# Patient Record
Sex: FEMALE | Race: WHITE | NOT HISPANIC OR LATINO | Employment: PART TIME | URBAN - METROPOLITAN AREA
[De-identification: names, ages, dates, MRNs, and addresses within clinical notes are randomized per-mention and may not be internally consistent; named-entity substitution may affect disease eponyms.]

---

## 2018-01-04 ENCOUNTER — HISTORICAL (OUTPATIENT)
Dept: RADIOLOGY | Facility: HOSPITAL | Age: 63
End: 2018-01-04

## 2018-02-07 ENCOUNTER — HISTORICAL (OUTPATIENT)
Dept: ENDOSCOPY | Facility: HOSPITAL | Age: 63
End: 2018-02-07

## 2018-04-09 ENCOUNTER — TELEPHONE (OUTPATIENT)
Dept: GASTROENTEROLOGY | Facility: CLINIC | Age: 63
End: 2018-04-09

## 2018-04-09 NOTE — TELEPHONE ENCOUNTER
Spoke with patient. Patient will call back to let the clinic know if 4/19 is a good time for her new patient appointment with Dr. Em.

## 2018-04-16 ENCOUNTER — TELEPHONE (OUTPATIENT)
Dept: GASTROENTEROLOGY | Facility: CLINIC | Age: 63
End: 2018-04-16

## 2018-04-16 NOTE — TELEPHONE ENCOUNTER
----- Message from Jessica Leslie sent at 4/16/2018  8:42 AM CDT -----  Contact: pt#[phone]  Pt wants to know if medical records was received. Please call

## 2018-04-16 NOTE — TELEPHONE ENCOUNTER
Spoke to Patient.    Patient was advised we have received all the records from Dr. Kraus.    Patient verbalizes understanding and confirms appointment.

## 2018-04-18 NOTE — PROGRESS NOTES
"[facility] Consultation Note    Reason for Consult:    Chief Complaint   Patient presents with    Chest Pain    Dysphagia    Emesis    Nausea    Bloated    Gas    Constipation    Weight Loss         PCP:   Taisha Richter   [address] / [place] [address]    Referring MD:  Uziel Kraus Md  [address]  [address]  [place] [address]      HPI:  Amber Gomez is a 62 y.o. female with a PMH of HLD, OA hips, kidney CA s/p partial left nephrectomy, WALKER, osteoporosis referred to motility clinic for second opinion regarding the following problems:  "second opinion for GERD, dysphagia, did not respond to botox treatment"    Dysphagia.  Odynophagia. Reports difficulty and painful swallowing.  Onset of symptoms:since 37 years old  Problems with solids:yes  Problems with liquids:yes  Choking while eating:no  Coughing while eating: yes  Location: upper, mid, and distal esophagus  Frequency: daily  Improves with:coughs it back up for relief. With white foamy materials. 2-3 week improvement after dilation. Few week improvement with Botox (minimal)  History of food impactions requiring ED visit:no  History of allergies:grass, dirt, carpet, pet dander   History of seasonal allergies:yes  History of food allergies:no  History of eczema:no    Nausea.  With bitter tasting belch. No vomiting.  Frequency: daily. Lasts for a few minutes  Onset: since botox in 2/2018  Improves with: time, belching  Has not tried zofran, phenergan, compazine, reglan, domperidone, scopolamine patch.      Early satiety. Since 37 yrs old. Full after few bits. Is eating smaller portions    Abdominal pain. Reports abdominal pain  Character:soreness, ache  Location:epigastric  Frequency:  constant  Duration:constant  Onset:since age 37 with worsening 1 year ago  Worse with:meals, swallowing  Improves with: no improvement with pantoprazole 40 mg daily x few weeks, but was coughing it back up d/t " dysphagia  Associated with Bm: no  Nocturnal pain: yes  Has not tried IBgard, Bentyl, Levsin, Levbid.  Antidepressants:no  Using narcotic pain medication: no     Gas and bloating.   Bloating: yes  Excessive gas: yes  Abdominal distension: yes   Belching: yes  Symptoms get worse after meals:yes  Symptoms get worse as the day progresses: no   Consumes lactose:rare  Consumes artificial sugars:no    Constipation. After partial nephrectomy in 10/2017.  1 bristol type 1 BM/day. Strains. Feelings of incomplete evacuation. Feelings of rectal blockage. No trauma. Tried digital evacuation w/o relief. No problems in childhood.   Some improvement with colace once daily  No improvement with Miralax BID x one month  Unable to tolerate Linzess 72 and 145 mcg daily or once weekly d/t severe diarrhea   Has not tried amitiza, trulance, fiber  Took  metamucil for diarrhea years ago.    Weight loss. 20 lb wt loss over a few months time last year associated with kidney CA. Now weight fluctuates.    WALKER. improvement with CPAP.    Denies GERD, vomiting,  diarrhea,  BRBPR, melena,  anxiety/depression.       Total visit time was 90 minutes, more than 50% of which was spent in face-to-face counseling with patient regarding symptoms, diagnostic results, prognosis, risks and benefits of treatment options, instructions for management, importance of compliance with chosen treatment options, risk factor reduction, stress reduction, coping strategies.      Previous Studies:   EGD 2/7/2018: benign appearing GEJ esophageal stenosis injected with 100 u botox. Nl stomach. Nl duodenum.   Esophagram 1/4/18: marked distal esophageal dysmotility with tertiary contractions. Stricture around GEJ w/ Barium tablet not passing into stomach. Tiny sliding HH.   Esophageal manometry 12/21/17: diffuse esophageal spasm. Suspect evolving type III spastic achalasia. IRP nl 5.1; DCI low 375.6; DL 5.1; failed swallows 50%; pan esophageal pressurization 10%; premature  contractions 20%; rapid contractions 30%; small breaks 40%; incomplete bolus clearance 100%. Personally reviewed by Dr. Em.    EGD 11/17/17: esophageal diverticulum. Dilated and tortuous esophagus. Esophageal dilation with 18mm balloon. GEJ NL (reflux changes in squamouscolumnar mucosa). NL Stomach (reactive gastropathy). NL duodenum.   CT chest 2/1/17: stable scattered tiny lung nodules. Diffuse esophageal wall thickening possible esophagitis.   Esophageal manometry 4/6/15: diffuse esophageal spasm with weak esophageal peristalsis and elevated LES. Residual post swallow pressures creating some degree of outflow obstruction which may be a subtype of NGA. IRP high 23.5; DCI .4; DL 4.7; failed swallows 10%; pan esophageal pressurization 10%; premature contractions 30%; rapid contractions 10%; small breaks 20%; incomplete bolus clearance 100%.  Personally reviewed by me.  GEJ Outflow obstruction w 30% premature contractions.   Colon 11/8/13: GPTTI. Sigmoid, descending, and hepatic flexure tics. NL TI. NL Colon rpt 7-10 yrs.  abd us 9/30/13: solid lft renal mass.   EGD 9/20/13: diverticulum at lower third of the esophagus. Benign stricture at lower third of esophagus. Dilated with balloon to 19 mm. Small HH. Tortuous esophagus. NL stomach(?). NL duodenum (reactive change).   *EGD 5/9/13: small HH. Minimal irritation at distal esophagus. Distal stomach w/ small ulcerations.  *EGD 6/5/07: small HH. Suspected distal esophageal structuring. antral gastritis. Nl duodenum. Torrie test negative.     *per referring provider note    ROS:  ROS   Constitutional: No fevers, no chills, no night sweats, no weight loss  ENT: No congestion, no rhinorrhea, no chronic sinus problems  CV: No chest pain, no palpitations  Pulm: + cough, no shortness of breath  Ophtho: No blurry vision, no eye redness  GI: see HPI  Derm: No rash  Heme: No lymphadenopathy, no bruising  MSK: + OA, no joint swelling, no Raynauds  : No dysuria, no  frequent urination, no blood in urine  Endo: No hot or cold intolerance  Neuro: No dizziness, no syncope, no seizure  Psych: No anxiety, no depression        Medical History:   Past Medical History:   Diagnosis Date    Cancer of left kidney     Diverticulitis     Fibroid tumor     positive for cancer    High cholesterol     Osteoarthritis of both hips     Osteoporosis         Surgical History:   Past Surgical History:   Procedure Laterality Date    BACK SURGERY  2010    CARPAL TUNNEL RELEASE  2016    CHOLECYSTECTOMY  2013    COLONOSCOPY  2016    ESOPHAGOGASTRODUODENOSCOPY  2017    KNEE SURGERY      PARTIAL HYSTERECTOMY      ROTATOR CUFF REPAIR  2012    SHOULDER SURGERY  2006    SINUS SURGERY  1986    TONSILLECTOMY          Family History:   Family History   Problem Relation Age of Onset    Heart defect Father     Stomach cancer Brother 67    Celiac disease Neg Hx     Colon cancer Neg Hx     Crohn's disease Neg Hx     Esophageal cancer Neg Hx     Inflammatory bowel disease Neg Hx     Irritable bowel syndrome Neg Hx     Liver cancer Neg Hx     Rectal cancer Neg Hx     Ulcerative colitis Neg Hx         Social History:   Social History     Social History    Marital status: Unknown     Spouse name: N/A    Number of children: N/A    Years of education: N/A     Social History Main Topics    Smoking status: Never Smoker    Smokeless tobacco: Never Used    Alcohol use No    Drug use: No    Sexual activity: Not Asked     Other Topics Concern    None     Social History Narrative    None        Review of patient's allergies indicates:  Allergies not on file    Current Outpatient Prescriptions   Medication Sig Dispense Refill    alendronate (FOSAMAX) 70 MG tablet TK 1 T PO Q WK  3    CRESTOR 20 mg tablet once daily.       fenofibric acid (FIBRICOR) 135 mg CpDR once daily.       fluticasone (FLONASE) 50 mcg/actuation nasal spray once daily.       hydroCHLOROthiazide (HYDRODIURIL) 50 MG  tablet once daily.       montelukast (SINGULAIR) 10 mg tablet once daily.       potassium chloride 10% (KAYCIEL) 20 mEq/15 mL oral solution Take by mouth once daily. One tablespoon in the morning and night      PREMARIN 0.625 mg tablet TK 1 T PO D  3    diltiaZEM (CARDIZEM) 30 MG tablet Take 1 tablet (30 mg total) by mouth every 6 (six) hours. Take 4 times a day 120 tablet 3    pantoprazole (PROTONIX) 40 MG tablet once daily.        No current facility-administered medications for this visit.         Objective Findings:  Vital Signs:  /79   Pulse 80   Ht 5' (1.524 m)   Wt 71.9 kg (158 lb 8.2 oz)   BMI 30.96 kg/m²   Body mass index is 30.96 kg/m².    Physical Exam:  General appearance: alert, cooperative, no distress  HENT: Normocephalic, atraumatic, neck symmetrical, no nasal discharge  Eyes: conjunctivae/corneas clear, PERRL, EOM's intact  Lungs: clear to auscultation bilaterally, no dullness to percussion bilaterally  Heart: regular rate and rhythm without rub; no displacement of the PMI  Abdomen: soft, non-tender; bowel sounds normoactive; no organomegaly  Extremities: extremities symmetric; no clubbing, cyanosis, or edema  Integument: Skin color, texture, turgor normal; no rashes; hair distrubution normal  Neurologic: Alert and oriented X 3, normal strength, normal coordination and gait  Psychiatric: no pressured speech; normal affect; no evidence of impaired cognition    Labs:  No results found for: WBC, HGB, HCT, MCV, PLT  No results found for: FERRITIN  No results found for: NA, K, CL, CO2, GLU, BUN, CREATININE, CALCIUM, PROT, ALBUMIN, BILITOT, ALKPHOS, AST, ALT  No results found for: TSH  No results found for: SEDRATE  No results found for: CRP  No results found for: LABA1C, HGBA1C        Assessment and Plan:  Amber Gomez is a 62 y.o. female with a PMH of HLD, OA hips, kidney CA s/p partial left nephrectomy, WALKER, osteoporosis referred to motility clinic for second opinion regarding the  "following problems:  "second opinion for GERD, dysphagia, did not respond to botox treatment"    Dysphagia.  Odynophagia. Regurgitation of foamy liquid. Symptoms present for >20 years. Conflicting results on previous manometry (NGA, low IRP (2017) and NGA w high IRP (2015). Esophagram with barium tablet hung up in GEJ.  Diverticulum, HH and tortuous esophagus on imaging.  Clinical presentation concerning for achalasia, possibly end stage.      No improvement with GEJ dilation  No improvement with botox (2/2018).   -Discussed that recent Botox injection may compromise the results of our workup, pt decided to delay workup to 6 months post Botox injection.   -EGD w e/g/d biopsies in 6 months (8/2018)    -Esophageal manometry in 6 months   -Esophagogram w 13 mm tablet in 6 months   -Start diltiazem 30 mg QID. Can increase to 60 mg if not better at follow up provided blood pressure is not affected.  -Start IBguard prn  -Will consider SL nitroglycerin    Nausea.  No vomiting. Early satiety.   -Small frequent meals   -EGD     Abdominal pain. Epigastric  No improvement with pantoprazole 40 mg daily x few weeks  -Start IBgard  -EGD  -Will consider Bentyl, Levsin, Levbid.     Gas and bloating. Distention. belching  Avoids artificial sweeteners  -Eliminate lactose    Constipation.   Unable to tolerate Linzess due to severe diarrhea  Some improvement with colace  -Follow with general gastroenterologist for management    Weight loss. 20 lb wt loss over a few months  last year associated with kidney CA. Recently weight stable    Follow-up for Motility w/ KATHY Beatty. in 2 months w Rick Kenyon, Dr. Em after imaging completed.     1. Esophageal dysphagia    2. Odynophagia    3. Epigastric abdominal pain    4. Abdominal bloating    5. Nausea    6. Early satiety          Order summary:  Orders Placed This Encounter    FL Esophagram Complete    Manometry Esophageal    diltiaZEM (CARDIZEM) 30 MG tablet    Case request GI: " ESOPHAGOGASTRODUODENOSCOPY (EGD)         Thank you so much for allowing me to participate in the care of ADRIÁN Fowler, FNP-C  Katlyn Em MD

## 2018-04-19 ENCOUNTER — OFFICE VISIT (OUTPATIENT)
Dept: GASTROENTEROLOGY | Facility: CLINIC | Age: 63
End: 2018-04-19
Payer: COMMERCIAL

## 2018-04-19 VITALS
DIASTOLIC BLOOD PRESSURE: 79 MMHG | HEART RATE: 80 BPM | HEIGHT: 60 IN | SYSTOLIC BLOOD PRESSURE: 123 MMHG | BODY MASS INDEX: 31.12 KG/M2 | WEIGHT: 158.5 LBS

## 2018-04-19 DIAGNOSIS — R14.0 ABDOMINAL BLOATING: ICD-10-CM

## 2018-04-19 DIAGNOSIS — R13.19 ESOPHAGEAL DYSPHAGIA: Primary | ICD-10-CM

## 2018-04-19 DIAGNOSIS — R13.10 ODYNOPHAGIA: ICD-10-CM

## 2018-04-19 DIAGNOSIS — R11.0 NAUSEA: ICD-10-CM

## 2018-04-19 DIAGNOSIS — R10.13 EPIGASTRIC ABDOMINAL PAIN: ICD-10-CM

## 2018-04-19 DIAGNOSIS — R68.81 EARLY SATIETY: ICD-10-CM

## 2018-04-19 PROCEDURE — 99999 PR PBB SHADOW E&M-EST. PATIENT-LVL IV: CPT | Mod: PBBFAC,,, | Performed by: NURSE PRACTITIONER

## 2018-04-19 PROCEDURE — 99205 OFFICE O/P NEW HI 60 MIN: CPT | Mod: S$GLB,,, | Performed by: NURSE PRACTITIONER

## 2018-04-19 RX ORDER — FENOFIBRIC ACID 135 MG/1
CAPSULE, DELAYED RELEASE ORAL DAILY
COMMUNITY
Start: 2018-04-04 | End: 2022-08-25

## 2018-04-19 RX ORDER — ALENDRONATE SODIUM 70 MG/1
TABLET ORAL
Refills: 3 | COMMUNITY
Start: 2018-03-21 | End: 2022-08-25

## 2018-04-19 RX ORDER — MONTELUKAST SODIUM 10 MG/1
TABLET ORAL DAILY
COMMUNITY
Start: 2018-01-10 | End: 2022-08-23

## 2018-04-19 RX ORDER — PANTOPRAZOLE SODIUM 40 MG/1
TABLET, DELAYED RELEASE ORAL DAILY
COMMUNITY
Start: 2018-04-18 | End: 2018-08-10

## 2018-04-19 RX ORDER — HYDROCHLOROTHIAZIDE 50 MG/1
50 TABLET ORAL DAILY
COMMUNITY
Start: 2018-03-22 | End: 2023-09-12

## 2018-04-19 RX ORDER — ESTROGENS, CONJUGATED 0.62 MG/1
TABLET, FILM COATED ORAL
Refills: 3 | COMMUNITY
Start: 2018-03-22 | End: 2022-08-25

## 2018-04-19 RX ORDER — DILTIAZEM HYDROCHLORIDE 30 MG/1
30 TABLET, FILM COATED ORAL EVERY 6 HOURS
Qty: 120 TABLET | Refills: 3 | Status: ON HOLD | OUTPATIENT
Start: 2018-04-19 | End: 2018-08-09 | Stop reason: SDUPTHER

## 2018-04-19 RX ORDER — POTASSIUM CHLORIDE 20 MEQ/15ML
20 SOLUTION ORAL DAILY
COMMUNITY

## 2018-04-19 RX ORDER — FLUTICASONE PROPIONATE 50 MCG
SPRAY, SUSPENSION (ML) NASAL DAILY
COMMUNITY
Start: 2018-04-04 | End: 2022-08-25

## 2018-04-19 RX ORDER — DILTIAZEM HYDROCHLORIDE 60 MG/1
60 TABLET, FILM COATED ORAL EVERY 6 HOURS
Qty: 120 TABLET | Refills: 3 | Status: SHIPPED | OUTPATIENT
Start: 2018-04-19 | End: 2018-04-19 | Stop reason: SDUPTHER

## 2018-04-19 RX ORDER — ROSUVASTATIN CALCIUM 20 MG/1
TABLET, FILM COATED ORAL DAILY
COMMUNITY
Start: 2018-04-17 | End: 2022-08-25

## 2018-06-05 ENCOUNTER — PATIENT MESSAGE (OUTPATIENT)
Dept: GASTROENTEROLOGY | Facility: CLINIC | Age: 63
End: 2018-06-05

## 2018-06-05 ENCOUNTER — TELEPHONE (OUTPATIENT)
Dept: ENDOSCOPY | Facility: HOSPITAL | Age: 63
End: 2018-06-05

## 2018-06-05 NOTE — TELEPHONE ENCOUNTER
Left message for pt to call to schedule motility and egd ordered by Dr Em, pt is due to come in August 2018.

## 2018-06-11 ENCOUNTER — TELEPHONE (OUTPATIENT)
Dept: ENDOSCOPY | Facility: HOSPITAL | Age: 63
End: 2018-06-11

## 2018-06-11 ENCOUNTER — PATIENT MESSAGE (OUTPATIENT)
Dept: GASTROENTEROLOGY | Facility: CLINIC | Age: 63
End: 2018-06-11

## 2018-06-14 ENCOUNTER — TELEPHONE (OUTPATIENT)
Dept: GASTROENTEROLOGY | Facility: CLINIC | Age: 63
End: 2018-06-14

## 2018-06-14 NOTE — TELEPHONE ENCOUNTER
Attempting to reach pt x 2 for phone consent with MATTEO Kraft NP.  No answer, message left for pt to return call to office.

## 2018-06-14 NOTE — TELEPHONE ENCOUNTER
----- Message from Carla Perales sent at 6/14/2018  9:29 AM CDT -----  Contact: self - [phone]  [facility] - returning your call re test to schedule - please call patient at

## 2018-06-14 NOTE — TELEPHONE ENCOUNTER
Spoke with patient and sent message to Radiology to schedule esophagram for when patient returns to [place].

## 2018-06-20 ENCOUNTER — PATIENT MESSAGE (OUTPATIENT)
Dept: GASTROENTEROLOGY | Facility: CLINIC | Age: 63
End: 2018-06-20

## 2018-06-21 ENCOUNTER — TELEPHONE (OUTPATIENT)
Dept: ENDOSCOPY | Facility: HOSPITAL | Age: 63
End: 2018-06-21

## 2018-06-21 ENCOUNTER — TELEPHONE (OUTPATIENT)
Dept: GASTROENTEROLOGY | Facility: CLINIC | Age: 63
End: 2018-06-21

## 2018-06-21 NOTE — TELEPHONE ENCOUNTER
Spoke with patient and let patient know about appointments with Dr. Em, esophagram and manometry/EGD.    Patient confirms and reminders mailed out.

## 2018-06-21 NOTE — TELEPHONE ENCOUNTER
Spoke with patient.    Patient is requesting to have the EGD/Manometry and after the results come in to come back and have the esophagram done at [facility].    We are unable to coordinate the esophagram with the EGD Manometry due to the 24 hour pH impedence and fasting.     Please advise.

## 2018-06-21 NOTE — TELEPHONE ENCOUNTER
----- Message from Aaliyah Bolanos MA sent at 6/21/2018  3:00 PM CDT -----      ----- Message -----  From: Trudy Painter  Sent: 6/21/2018   2:03 PM  To: Swapnil Garcia Staff    Needs Advice    Reason for call:  Regarding scheduling tests    Communication Preference: [phone]  Additional Information:

## 2018-06-22 ENCOUNTER — TELEPHONE (OUTPATIENT)
Dept: ENDOSCOPY | Facility: HOSPITAL | Age: 63
End: 2018-06-22

## 2018-06-22 NOTE — TELEPHONE ENCOUNTER
Esophageal Manometry and EGD scheduled on 8/9/18 Manometry @8:00am and EGD to follow at 9:30am. Ok per Dr. Em to book these procedures on the same day. New prep mailed.

## 2018-08-09 ENCOUNTER — HOSPITAL ENCOUNTER (OUTPATIENT)
Facility: HOSPITAL | Age: 63
Discharge: HOME | End: 2018-08-09
Attending: INTERNAL MEDICINE | Admitting: INTERNAL MEDICINE
Payer: COMMERCIAL

## 2018-08-09 ENCOUNTER — TELEPHONE (OUTPATIENT)
Dept: RADIOLOGY | Facility: HOSPITAL | Age: 63
End: 2018-08-09

## 2018-08-09 ENCOUNTER — ANESTHESIA EVENT (OUTPATIENT)
Dept: ENDOSCOPY | Facility: HOSPITAL | Age: 63
End: 2018-08-09
Payer: COMMERCIAL

## 2018-08-09 ENCOUNTER — ANESTHESIA (OUTPATIENT)
Dept: ENDOSCOPY | Facility: HOSPITAL | Age: 63
End: 2018-08-09
Payer: COMMERCIAL

## 2018-08-09 VITALS
HEIGHT: 60 IN | TEMPERATURE: 98 F | SYSTOLIC BLOOD PRESSURE: 135 MMHG | HEART RATE: 75 BPM | RESPIRATION RATE: 18 BRPM | OXYGEN SATURATION: 96 % | BODY MASS INDEX: 30.43 KG/M2 | DIASTOLIC BLOOD PRESSURE: 64 MMHG | WEIGHT: 155 LBS

## 2018-08-09 DIAGNOSIS — R13.10 DYSPHAGIA: ICD-10-CM

## 2018-08-09 DIAGNOSIS — R13.19 ESOPHAGEAL DYSPHAGIA: ICD-10-CM

## 2018-08-09 DIAGNOSIS — R13.10 ODYNOPHAGIA: ICD-10-CM

## 2018-08-09 PROCEDURE — 25000003 PHARM REV CODE 250: Performed by: INTERNAL MEDICINE

## 2018-08-09 PROCEDURE — 88305 TISSUE EXAM BY PATHOLOGIST: CPT | Performed by: PATHOLOGY

## 2018-08-09 PROCEDURE — 37000008 HC ANESTHESIA 1ST 15 MINUTES: Performed by: INTERNAL MEDICINE

## 2018-08-09 PROCEDURE — 43239 EGD BIOPSY SINGLE/MULTIPLE: CPT | Performed by: INTERNAL MEDICINE

## 2018-08-09 PROCEDURE — 88305 TISSUE EXAM BY PATHOLOGIST: CPT | Mod: 26,,, | Performed by: PATHOLOGY

## 2018-08-09 PROCEDURE — 25000003 PHARM REV CODE 250: Performed by: NURSE ANESTHETIST, CERTIFIED REGISTERED

## 2018-08-09 PROCEDURE — 91010 ESOPHAGUS MOTILITY STUDY: CPT | Mod: 26,,, | Performed by: INTERNAL MEDICINE

## 2018-08-09 PROCEDURE — E9220 PRA ENDO ANESTHESIA: HCPCS | Mod: ,,, | Performed by: NURSE ANESTHETIST, CERTIFIED REGISTERED

## 2018-08-09 PROCEDURE — 27201012 HC FORCEPS, HOT/COLD, DISP: Performed by: INTERNAL MEDICINE

## 2018-08-09 PROCEDURE — 91037 ESOPH IMPED FUNCTION TEST: CPT | Performed by: INTERNAL MEDICINE

## 2018-08-09 PROCEDURE — 91010 ESOPHAGUS MOTILITY STUDY: CPT | Performed by: INTERNAL MEDICINE

## 2018-08-09 PROCEDURE — 63600175 PHARM REV CODE 636 W HCPCS: Performed by: NURSE ANESTHETIST, CERTIFIED REGISTERED

## 2018-08-09 PROCEDURE — 91037 ESOPH IMPED FUNCTION TEST: CPT | Mod: 26,,, | Performed by: INTERNAL MEDICINE

## 2018-08-09 PROCEDURE — 43239 EGD BIOPSY SINGLE/MULTIPLE: CPT | Mod: ,,, | Performed by: INTERNAL MEDICINE

## 2018-08-09 PROCEDURE — 37000009 HC ANESTHESIA EA ADD 15 MINS: Performed by: INTERNAL MEDICINE

## 2018-08-09 RX ORDER — SODIUM CHLORIDE 9 MG/ML
INJECTION, SOLUTION INTRAVENOUS CONTINUOUS
Status: DISCONTINUED | OUTPATIENT
Start: 2018-08-09 | End: 2018-08-09 | Stop reason: HOSPADM

## 2018-08-09 RX ORDER — SODIUM CHLORIDE 0.9 % (FLUSH) 0.9 %
3 SYRINGE (ML) INJECTION
Status: DISCONTINUED | OUTPATIENT
Start: 2018-08-09 | End: 2018-08-09 | Stop reason: HOSPADM

## 2018-08-09 RX ORDER — LIDOCAINE HYDROCHLORIDE 20 MG/ML
JELLY TOPICAL ONCE
Status: COMPLETED | OUTPATIENT
Start: 2018-08-09 | End: 2018-08-09

## 2018-08-09 RX ORDER — PROPOFOL 10 MG/ML
VIAL (ML) INTRAVENOUS
Status: DISCONTINUED | OUTPATIENT
Start: 2018-08-09 | End: 2018-08-09

## 2018-08-09 RX ORDER — GLYCOPYRROLATE 0.2 MG/ML
INJECTION INTRAMUSCULAR; INTRAVENOUS
Status: DISCONTINUED | OUTPATIENT
Start: 2018-08-09 | End: 2018-08-09

## 2018-08-09 RX ORDER — DILTIAZEM HYDROCHLORIDE 30 MG/1
TABLET, FILM COATED ORAL
Qty: 120 TABLET | Refills: 0 | Status: SHIPPED | OUTPATIENT
Start: 2018-08-09 | End: 2018-09-07 | Stop reason: SDUPTHER

## 2018-08-09 RX ORDER — LIDOCAINE HCL/PF 100 MG/5ML
SYRINGE (ML) INTRAVENOUS
Status: DISCONTINUED | OUTPATIENT
Start: 2018-08-09 | End: 2018-08-09

## 2018-08-09 RX ADMIN — LIDOCAINE HYDROCHLORIDE 10 ML: 20 JELLY TOPICAL at 07:08

## 2018-08-09 RX ADMIN — LIDOCAINE HYDROCHLORIDE 100 MG: 20 INJECTION, SOLUTION INTRAVENOUS at 09:08

## 2018-08-09 RX ADMIN — GLYCOPYRROLATE 0.2 MG: 0.2 INJECTION, SOLUTION INTRAMUSCULAR; INTRAVENOUS at 09:08

## 2018-08-09 RX ADMIN — SODIUM CHLORIDE: 0.9 INJECTION, SOLUTION INTRAVENOUS at 07:08

## 2018-08-09 RX ADMIN — PROPOFOL 50 MG: 10 INJECTION, EMULSION INTRAVENOUS at 09:08

## 2018-08-09 RX ADMIN — PROPOFOL 100 MG: 10 INJECTION, EMULSION INTRAVENOUS at 09:08

## 2018-08-09 NOTE — DISCHARGE INSTRUCTIONS

## 2018-08-09 NOTE — ANESTHESIA POSTPROCEDURE EVALUATION
Anesthesia Post Evaluation    Patient: Amber Gomez    Procedure(s) Performed: Procedure(s) (LRB):  MANOMETRY, ESOPHAGEAL, WITH IMPEDANCE MEASUREMENT (N/A)  ESOPHAGOGASTRODUODENOSCOPY (EGD) (N/A)    Final Anesthesia Type: general  Patient location during evaluation: PACU  Patient participation: Yes- Able to Participate  Level of consciousness: awake and alert and oriented  Post-procedure vital signs: reviewed and stable  Pain management: adequate  Airway patency: patent  PONV status at discharge: No PONV  Anesthetic complications: no      Cardiovascular status: blood pressure returned to baseline and hemodynamically stable  Respiratory status: unassisted, spontaneous ventilation and room air  Hydration status: euvolemic  Follow-up not needed.        Visit Vitals  /64   Pulse 75   Temp 36.7 °C (98.1 °F)   Resp 18   Ht 5' (1.524 m)   Wt 70.3 kg (155 lb)   SpO2 96%   Breastfeeding? No   BMI 30.27 kg/m²       Pain/Adal Score: Pain Assessment Performed: Yes (8/9/2018 10:15 AM)  Presence of Pain: denies (8/9/2018 10:15 AM)  Adal Score: 10 (8/9/2018 10:15 AM)

## 2018-08-09 NOTE — ANESTHESIA PREPROCEDURE EVALUATION
08/09/2018  Amber Gomez is a 62 y.o., female.    Anesthesia Evaluation    I have reviewed the Patient Summary Reports.     I have reviewed the Medications.     Review of Systems  Anesthesia Hx:  No problems with previous Anesthesia Denies Hx of Anesthetic complications  Neg history of prior surgery. Denies Family Hx of Anesthesia complications.   Denies Personal Hx of Anesthesia complications.   Social:  Non-Smoker, Social Alcohol Use    Hematology/Oncology:  Hematology Normal   Oncology Normal     EENT/Dental:EENT/Dental Normal   Cardiovascular:  Cardiovascular Normal Exercise tolerance: good     Pulmonary:  Pulmonary Normal    Renal/:  Renal/ Normal  Denies Chronic Renal Disease.     Hepatic/GI:  Hepatic/GI Normal    Musculoskeletal:   Arthritis     Neurological:  Neurology Normal    Endocrine:  Endocrine Normal    Dermatological:  Skin Normal    Psych:  Psychiatric Normal           Physical Exam  General:  Well nourished    Airway/Jaw/Neck:  Airway Findings: Mouth Opening: Normal Tongue: Normal  General Airway Assessment: Adult  Mallampati: II  Improves to II with phonation.  TM Distance: Normal, at least 6 cm     Eyes/Ears/Nose:  EYES/EARS/NOSE FINDINGS: Normal   Dental:  DENTAL FINDINGS: Normal   Chest/Lungs:  Chest/Lungs Findings: Clear to auscultation, Normal Respiratory Rate     Heart/Vascular:  Heart Findings: Rate: Normal  Rhythm: Regular Rhythm  Sounds: Normal  Heart murmur: negative       Mental Status:  Mental Status Findings:  Cooperative, Alert and Oriented         Anesthesia Plan  Type of Anesthesia, risks & benefits discussed:  Anesthesia Type:  general  Patient's Preference: General  Intra-op Monitoring Plan: standard ASA monitors  Intra-op Monitoring Plan Comments:   Post Op Pain Control Plan:   Post Op Pain Control Plan Comments:   Induction:   IV  Beta Blocker:  Patient is not  currently on a Beta-Blocker (No further documentation required).       Informed Consent: Patient understands risks and agrees with Anesthesia plan.  Questions answered. Anesthesia consent signed with patient.  ASA Score: 2     Day of Surgery Review of History & Physical:    H&P update referred to the surgeon.         Ready For Surgery From Anesthesia Perspective.

## 2018-08-09 NOTE — TRANSFER OF CARE
Anesthesia Transfer of Care Note    Patient: Amber Gomez    Procedure(s) Performed: Procedure(s) (LRB):  MANOMETRY, ESOPHAGEAL, WITH IMPEDANCE MEASUREMENT (N/A)  ESOPHAGOGASTRODUODENOSCOPY (EGD) (N/A)    Patient location: PACU    Anesthesia Type: general    Transport from OR: Transported from OR on room air with adequate spontaneous ventilation    Post pain: adequate analgesia    Post assessment: tolerated procedure well and no apparent anesthetic complications    Post vital signs: stable    Level of consciousness: awake, alert and oriented    Nausea/Vomiting: no nausea/vomiting    Complications: none    Transfer of care protocol was followed      Last vitals:   Visit Vitals  /79 (BP Location: Left arm, Patient Position: Lying)   Pulse 74   Temp 36.6 °C (97.9 °F) (Temporal)   Resp 18   Ht 5' (1.524 m)   Wt 70.3 kg (155 lb)   SpO2 96%   Breastfeeding? No   BMI 30.27 kg/m²

## 2018-08-09 NOTE — [employer] PATIENT INSTRUCTIONS
Discharge Summary/Instructions after an Endoscopic Procedure  Patient Name: Amber Gomez  Patient MRN: [id number]  Patient YOB: 1955 Thursday, August 09, 2018  Katlyn Em MD  RESTRICTIONS:  During your procedure today, you received medications for sedation.  These   medications may affect your judgment, balance and coordination.  Therefore,   for 24 hours, you have the following restrictions:   - DO NOT drive a car, operate machinery, make legal/financial decisions,   sign important papers or drink alcohol.    ACTIVITY:  Today: no heavy lifting, straining or running due to procedural   sedation/anesthesia.  The following day: return to full activity including work.  DIET:  Eat and drink normally unless instructed otherwise.     TREATMENT FOR COMMON SIDE EFFECTS:  - Mild abdominal pain, nausea, belching, bloating or excessive gas:  rest,   eat lightly and use a heating pad.  - Sore Throat: treat with throat lozenges and/or gargle with warm salt   water.  - Because air was used during the procedure, expelling large amounts of air   from your rectum or belching is normal.  - If a bowel prep was taken, you may not have a bowel movement for 1-3 days.    This is normal.  SYMPTOMS TO WATCH FOR AND REPORT TO YOUR PHYSICIAN:  1. Abdominal pain or bloating, other than gas cramps.  2. Chest pain.  3. Back pain.  4. Signs of infection such as: chills or fever occurring within 24 hours   after the procedure.  5. Rectal bleeding, which would show as bright red, maroon, or black stools.   (A tablespoon of blood from the rectum is not serious, especially if   hemorrhoids are present.)  6. Vomiting.  7. Weakness or dizziness.  GO DIRECTLY TO THE NEAREST EMERGENCY ROOM IF YOU HAVE ANY OF THE FOLLOWING:      Difficulty breathing              Chills and/or fever over 101 F   Persistent vomiting and/or vomiting blood   Severe abdominal pain   Severe chest pain   Black, tarry stools   Bleeding- more than one  tablespoon   Any other symptom or condition that you feel may need urgent attention  Your doctor recommends these additional instructions:  If any biopsies were taken, your doctors clinic will contact you in 1 to 2   weeks with any results.  - Discharge patient to home (with escort).   - Resume previous diet.   - Continue present medications.   - Await pathology results.   - Return to my office as previously scheduled.   - The findings and recommendations were discussed with the patient.   - Patient has a contact number available for emergencies.  The signs and   symptoms of potential delayed complications were discussed with the   patient.  Return to normal activities tomorrow.  Written discharge   instructions were provided to the patient.   For questions, problems or results please call your physician - Katlyn Em MD at Work:  [phone].  [facility], EMERGENCY ROOM PHONE NUMBER: [phone]  IF A COMPLICATION OR EMERGENCY SITUATION ARISES AND YOU ARE UNABLE TO REACH   YOUR PHYSICIAN - GO DIRECTLY TO THE EMERGENCY ROOM.  Katlyn Em MD  8/9/2018 9:44:23 AM  This report has been verified and signed electronically.  [employer]

## 2018-08-09 NOTE — H&P
Short Stay Endoscopy History and Physical    PCP - Taisha Richter MD     Procedure - EGD  ASA - per anesthesia  Mallampati - per anesthesia  History of Anesthesia problems - no  Family history Anesthesia problems -  no   Plan of anesthesia - General    HPI:  This is a 62 y.o. female here for evaluation of dysphagia, espigastric pain, nausea :     ROS:  Constitutional: No fevers, chills, No weight loss  CV: No chest pain  Pulm: No cough, No shortness of breath  Ophtho: No vision changes  GI: see HPI  Derm: No rash    Medical History:  has a past medical history of Cancer of left kidney; Diverticulitis; Fibroid tumor; High cholesterol; Osteoarthritis of both hips; and Osteoporosis.    Surgical History:  has a past surgical history that includes Sinus surgery (1986); Partial hysterectomy; Knee surgery; Shoulder surgery (2006); Back surgery (2010); Rotator cuff repair (2012); Cholecystectomy (2013); Carpal tunnel release (2016); Esophagogastroduodenoscopy (2017); Colonoscopy (2016); and Tonsillectomy.    Family History: family history includes Heart defect in her father; Stomach cancer (age of onset: 67) in her brother.. Otherwise no colon cancer, inflammatory bowel disease, or GI malignancies.    Social History:  reports that she has never smoked. She has never used smokeless tobacco. She reports that she does not drink alcohol or use drugs.    Review of patient's allergies indicates:  No Known Allergies    Medications:   Prescriptions Prior to Admission   Medication Sig Dispense Refill Last Dose    CRESTOR 20 mg tablet once daily.    8/8/2018 at Unknown time    fenofibric acid (FIBRICOR) 135 mg CpDR once daily.    8/8/2018 at Unknown time    fluticasone (FLONASE) 50 mcg/actuation nasal spray once daily.    8/8/2018 at Unknown time    hydroCHLOROthiazide (HYDRODIURIL) 50 MG tablet once daily.    8/9/2018 at Unknown time    montelukast (SINGULAIR) 10 mg tablet once daily.    8/8/2018 at Unknown time     pantoprazole (PROTONIX) 40 MG tablet once daily.    8/9/2018 at Unknown time    PREMARIN 0.625 mg tablet TK 1 T PO D  3 8/9/2018 at Unknown time    alendronate (FOSAMAX) 70 MG tablet TK 1 T PO Q WK  3 Unknown at Unknown time    potassium chloride 10% (KAYCIEL) 20 mEq/15 mL oral solution Take by mouth once daily. One tablespoon in the morning and night   Taking       Physical Exam:    Vital Signs:   Vitals:    08/09/18 0724   BP: 123/79   Pulse: 74   Resp: 18   Temp: 97.9 °F (36.6 °C)       General Appearance: Well appearing in no acute distress  Eyes:    No scleral icterus  Lungs: CTA anteriorly  Heart:  Regular rate, S1, S2 normal, no murmurs heard.  Abdomen: Soft, non tender, non distended with normal bowel sounds. No hepatosplenomegaly, ascites, or mass.  Extremities: No edema  Skin: No rash    Labs:  No results found for: WBC, HGB, HCT, PLT, CHOL, TRIG, HDL, LDLDIRECT, ALT, AST, NA, K, CL, CREATININE, BUN, CO2, TSH, PSA, INR, GLUF, HGBA1C, MICROALBUR    I have explained the risks and benefits of endoscopy procedures to the patient including but not limited to bleeding, perforation, infection, and death.      Katlyn Em MD

## 2018-08-10 ENCOUNTER — OFFICE VISIT (OUTPATIENT)
Dept: GASTROENTEROLOGY | Facility: CLINIC | Age: 63
End: 2018-08-10
Payer: COMMERCIAL

## 2018-08-10 ENCOUNTER — HOSPITAL ENCOUNTER (OUTPATIENT)
Dept: RADIOLOGY | Facility: HOSPITAL | Age: 63
Discharge: HOME | End: 2018-08-10
Attending: NURSE PRACTITIONER
Payer: COMMERCIAL

## 2018-08-10 ENCOUNTER — TELEPHONE (OUTPATIENT)
Dept: GASTROENTEROLOGY | Facility: CLINIC | Age: 63
End: 2018-08-10

## 2018-08-10 VITALS
HEIGHT: 60 IN | BODY MASS INDEX: 31.17 KG/M2 | HEART RATE: 80 BPM | SYSTOLIC BLOOD PRESSURE: 115 MMHG | DIASTOLIC BLOOD PRESSURE: 81 MMHG | WEIGHT: 158.75 LBS

## 2018-08-10 DIAGNOSIS — R11.0 NAUSEA: ICD-10-CM

## 2018-08-10 DIAGNOSIS — K59.00 CONSTIPATION, UNSPECIFIED CONSTIPATION TYPE: ICD-10-CM

## 2018-08-10 DIAGNOSIS — R13.19 ESOPHAGEAL DYSPHAGIA: ICD-10-CM

## 2018-08-10 DIAGNOSIS — R91.1 LUNG NODULE: ICD-10-CM

## 2018-08-10 DIAGNOSIS — R68.81 EARLY SATIETY: ICD-10-CM

## 2018-08-10 DIAGNOSIS — R13.10 DYSPHAGIA, UNSPECIFIED TYPE: Primary | ICD-10-CM

## 2018-08-10 DIAGNOSIS — R13.10 ODYNOPHAGIA: ICD-10-CM

## 2018-08-10 DIAGNOSIS — R14.0 BLOATING: ICD-10-CM

## 2018-08-10 PROCEDURE — 3008F BODY MASS INDEX DOCD: CPT | Mod: CPTII,S$GLB,, | Performed by: INTERNAL MEDICINE

## 2018-08-10 PROCEDURE — 74220 X-RAY XM ESOPHAGUS 1CNTRST: CPT | Mod: 26,,, | Performed by: RADIOLOGY

## 2018-08-10 PROCEDURE — 99215 OFFICE O/P EST HI 40 MIN: CPT | Mod: S$GLB,,, | Performed by: INTERNAL MEDICINE

## 2018-08-10 PROCEDURE — 25500020 PHARM REV CODE 255: Performed by: NURSE PRACTITIONER

## 2018-08-10 PROCEDURE — 74220 X-RAY XM ESOPHAGUS 1CNTRST: CPT | Mod: TC

## 2018-08-10 PROCEDURE — 99999 PR PBB SHADOW E&M-EST. PATIENT-LVL III: CPT | Mod: PBBFAC,,, | Performed by: INTERNAL MEDICINE

## 2018-08-10 RX ORDER — PANTOPRAZOLE SODIUM 40 MG/1
40 TABLET, DELAYED RELEASE ORAL 2 TIMES DAILY
Qty: 180 TABLET | Refills: 3 | Status: SHIPPED | OUTPATIENT
Start: 2018-08-10 | End: 2022-08-25

## 2018-08-10 RX ADMIN — IOHEXOL 200 ML: 350 INJECTION, SOLUTION INTRAVENOUS at 08:08

## 2018-08-10 NOTE — [employer] PATIENT INSTRUCTIONS
Discharge Summary/Instructions after an Endoscopic Procedure  Patient Name: Amber Gomez  Patient MRN: [id number]  Patient YOB: 1955 Thursday, August 09, 2018  Katlyn Em MD  RESTRICTIONS:  During your procedure today, you received medications for sedation.  These   medications may affect your judgment, balance and coordination.  Therefore,   for 24 hours, you have the following restrictions:   - DO NOT drive a car, operate machinery, make legal/financial decisions,   sign important papers or drink alcohol.    ACTIVITY:  Today: no heavy lifting, straining or running due to procedural   sedation/anesthesia.  The following day: return to full activity including work.  DIET:  Eat and drink normally unless instructed otherwise.     TREATMENT FOR COMMON SIDE EFFECTS:  - Mild abdominal pain, nausea, belching, bloating or excessive gas:  rest,   eat lightly and use a heating pad.  - Sore Throat: treat with throat lozenges and/or gargle with warm salt   water.  - Because air was used during the procedure, expelling large amounts of air   from your rectum or belching is normal.  - If a bowel prep was taken, you may not have a bowel movement for 1-3 days.    This is normal.  SYMPTOMS TO WATCH FOR AND REPORT TO YOUR PHYSICIAN:  1. Abdominal pain or bloating, other than gas cramps.  2. Chest pain.  3. Back pain.  4. Signs of infection such as: chills or fever occurring within 24 hours   after the procedure.  5. Rectal bleeding, which would show as bright red, maroon, or black stools.   (A tablespoon of blood from the rectum is not serious, especially if   hemorrhoids are present.)  6. Vomiting.  7. Weakness or dizziness.  GO DIRECTLY TO THE NEAREST EMERGENCY ROOM IF YOU HAVE ANY OF THE FOLLOWING:      Difficulty breathing              Chills and/or fever over 101 F   Persistent vomiting and/or vomiting blood   Severe abdominal pain   Severe chest pain   Black, tarry stools   Bleeding- more than one  tablespoon   Any other symptom or condition that you feel may need urgent attention  Your doctor recommends these additional instructions:  If any biopsies were taken, your doctors clinic will contact you in 1 to 2   weeks with any results.  - Return to my office as previously scheduled.  For questions, problems or results please call your physician - Katlny Em MD at Work:  [phone].  [facility], EMERGENCY ROOM PHONE NUMBER: [phone]  IF A COMPLICATION OR EMERGENCY SITUATION ARISES AND YOU ARE UNABLE TO REACH   YOUR PHYSICIAN - GO DIRECTLY TO THE EMERGENCY ROOM.  Katlyn Em MD  8/10/2018 9:32:08 AM  This report has been verified and signed electronically.  [employer]

## 2018-08-10 NOTE — PATIENT INSTRUCTIONS
-Start taking Linzess 72mcg with breakfast every day for constiaption  -If that does not work than increase to two pills 72 pills with breakfast    -Eliminate all forms of dairy/lactose (milk, cheese, butter, creamers, ice cream etc) from your diet for 14 days to see if your symptoms improve.  -Avoid triggers that may cause trouble swallowing: cold fluids, caffeine, smoking, ETOH  -Eat soft foods and liquids as they trigger fewer symptoms  -Start pantoprazole 40mg twice per day   -Try peppermint three times per day (mint, peppermint tea ) to see if that helps move food down esophagus  -Increase diltiazem to 60mg four times per for esophageal spasms.  This may lower your blood pressure.

## 2018-08-10 NOTE — PROGRESS NOTES
"[facility] Consultation Note    Reason for Consult:    Chief Complaint   Patient presents with    Dysphagia    Chest Pain    Abdominal Pain    Nausea    Emesis    Gas    Bloated    Constipation       PCP:   Taisha Richter       Referring MD:  Uziel Kraus Md  [address] [facility]  [address]  [place] [address]    HPI:  Amber Gomez is a 62 y.o. female with a PMH of HLD, OA hips, kidney CA s/p partial left nephrectomy, WALKER, osteoporosis referred to motility clinic for second opinion regarding the following problems:  "second opinion for GERD, dysphagia, did not respond to botox treatment"    Dysphagia.  Still w dyphagi james solids and liquids.  Somewhat improved w diltiazem   Onset of symptoms:since 37 years old  Frequency: daily  Regurgitation of white foamy materials.  Few week improvement with Botox (minimal)  Started diltiazem with improvement of dysphagia and regurgitation    Achalasia Eckardt Score  Dysphagia  (none (0), occasional (1), daily (2), with every meal (3)): 2  Regurgitation (none (0), occasional (1), daily (2), with every meal (3)): 1   Chest pain (none (0), occasional (1), daily (2), several times per day (3)): 0   Weight loss (kg) (0 (0), <5 (1), 5-10 (2), >10 (3)): 0  Total Eckardt Score(the final score is the sum of four components (0-12)): 3     Unable to tolerate peppermint oil due to emesis     Nausea.  Occasional.  Improved w diltiazem   No vomiting.  Early satiety.  Still feels full after eating     Abdominal pain. Has epigastric pain   Frequency:  constant  Onset:since age 37 with worsening 1 year ago  Worse with:meals, swallowing  Did not try IBguard     Gas and bloating. Still with lots of gas and bloating.  Consumes some lactose.  No artificial sugars    Constipation. Recently worse.  bristol type 1 BM/day.   No improvement with Miralax BID x one month  Unable to tolerate Linzess 72  Had diarrhea on linzess 145 mcg daily  Has not tried amitiza, " trulance  No improvement w metamucil     Weight loss. Stable at 158lb.  20 lb wt loss over a few months time last year associated with kidney CA. Now weight fluctuates.    Denies GERD, vomiting,  diarrhea,  BRBPR, melena,  anxiety/depression.       Total visit time was 40 minutes, more than 50% of which was spent in face-to-face counseling with patient regarding symptoms, diagnostic results, prognosis, risks and benefits of treatment options, instructions for management, importance of compliance with chosen treatment options, risk factor reduction, stress reduction, coping strategies.      Previous Studies:   Esophagogram 8/10/2018: pending  Manometry 8/9/2010: EGJ outflow obstruction (achalasia variant vs mechanical obstruction) with NGA vs Type III achalasia treated with Botox.  Incomplete bolus clearance.  Hiatal hernia (done on diltiazem)  Evidence of pressurization with multiple rapid swallows test.    EGD 8/9/2018:   - Normal proximal esophagus (P/D Bx: ).  Tortuous lower third of esophagus. 1 cm hiatal hernia. Z-line regular, 39 cm from the incisors. No resistance to passage of the scope. Mucosal nodule found at GEJ ().  Non-bleeding erosive gastropathy.  Erythematous mucosa in the gastric body (). Two gastric polyps ().  Duodenal erosions. Normal D2 ().   EGD 2/7/2018: benign appearing GEJ esophageal stenosis injected with 100 u botox. Nl stomach. Nl duodenum.   Esophagram 1/4/18: marked distal esophageal dysmotility with tertiary contractions. Stricture around GEJ w/ Barium tablet not passing into stomach. Tiny sliding HH.   Esophageal manometry 12/21/17: diffuse esophageal spasm. Suspect evolving type III spastic achalasia. IRP nl 5.1; DCI low 375.6; DL 5.1; failed swallows 50%; pan esophageal pressurization 10%; premature contractions 20%; rapid contractions 30%; small breaks 40%; incomplete bolus clearance 100%. Personally reviewed by Dr. Em.    EGD 11/17/17: esophageal diverticulum. Dilated and  tortuous esophagus. Esophageal dilation with 18mm balloon. GEJ NL (reflux changes in squamouscolumnar mucosa). NL Stomach (reactive gastropathy). NL duodenum.   CT chest 2/1/17: stable scattered tiny lung nodules. Diffuse esophageal wall thickening possible esophagitis.   Esophageal manometry 4/6/15: diffuse esophageal spasm with weak esophageal peristalsis and elevated LES. Residual post swallow pressures creating some degree of outflow obstruction which may be a subtype of NGA. IRP high 23.5; DCI .4; DL 4.7; failed swallows 10%; pan esophageal pressurization 10%; premature contractions 30%; rapid contractions 10%; small breaks 20%; incomplete bolus clearance 100%.  Personally reviewed by me.  GEJ Outflow obstruction w 30% premature contractions.   Colon 11/8/13: GPTTI. Sigmoid, descending, and hepatic flexure tics. NL TI. NL Colon rpt 7-10 yrs.  abd us 9/30/13: solid lft renal mass.   EGD 9/20/13: diverticulum at lower third of the esophagus. Benign stricture at lower third of esophagus. Dilated with balloon to 19 mm. Small HH. Tortuous esophagus. NL stomach(?). NL duodenum (reactive change).   *EGD 5/9/13: small HH. Minimal irritation at distal esophagus. Distal stomach w/ small ulcerations.  *EGD 6/5/07: small HH. Suspected distal esophageal structuring. antral gastritis. Nl duodenum. Torrie test negative.     *per referring provider note    ROS:  ROS   Constitutional: No fevers, no chills, no night sweats, no weight loss  ENT: No congestion, no rhinorrhea, no chronic sinus problems  CV: + chest pain, no palpitations  Pulm: + cough, no shortness of breath  Ophtho: No blurry vision, no eye redness  GI: see HPI  Derm: No rash  Heme: No lymphadenopathy, no bruising  MSK: + OA, no joint swelling, no Raynauds  : No dysuria, no frequent urination, no blood in urine  Endo: No hot or cold intolerance  Neuro: No dizziness, no syncope, no seizure  Psych: No anxiety, no depression        Medical History:   Past  Medical History:   Diagnosis Date    Cancer of left kidney     Diverticulitis     Fibroid tumor     positive for cancer    High cholesterol     Osteoarthritis of both hips     Osteoporosis         Surgical History:   Past Surgical History:   Procedure Laterality Date    BACK SURGERY  2010    CARPAL TUNNEL RELEASE  2016    CHOLECYSTECTOMY  2013    COLONOSCOPY  2016    ESOPHAGOGASTRODUODENOSCOPY  2017    KNEE SURGERY      PARTIAL HYSTERECTOMY      ROTATOR CUFF REPAIR  2012    SHOULDER SURGERY  2006    SINUS SURGERY  1986    TONSILLECTOMY          Family History:   Family History   Problem Relation Age of Onset    Heart defect Father     Stomach cancer Brother 67    Celiac disease Neg Hx     Colon cancer Neg Hx     Crohn's disease Neg Hx     Esophageal cancer Neg Hx     Inflammatory bowel disease Neg Hx     Irritable bowel syndrome Neg Hx     Liver cancer Neg Hx     Rectal cancer Neg Hx     Ulcerative colitis Neg Hx         Social History:   Social History     Social History    Marital status: Unknown     Spouse name: N/A    Number of children: N/A    Years of education: N/A     Social History Main Topics    Smoking status: Never Smoker    Smokeless tobacco: Never Used    Alcohol use No    Drug use: No    Sexual activity: Not Asked     Other Topics Concern    None     Social History Narrative    None        Review of patient's allergies indicates:  Allergies not on file    Current Outpatient Prescriptions   Medication Sig Dispense Refill    alendronate (FOSAMAX) 70 MG tablet TK 1 T PO Q WK  3    CRESTOR 20 mg tablet once daily.       diltiaZEM (CARDIZEM) 30 MG tablet TAKE 1 TABLET BY MOUTH EVERY 6 HOURS(4 TMES A DAY) 120 tablet 0    fenofibric acid (FIBRICOR) 135 mg CpDR once daily.       fluticasone (FLONASE) 50 mcg/actuation nasal spray once daily.       hydroCHLOROthiazide (HYDRODIURIL) 50 MG tablet once daily.       montelukast (SINGULAIR) 10 mg tablet once daily.        "potassium chloride 10% (KAYCIEL) 20 mEq/15 mL oral solution Take by mouth once daily. One tablespoon in the morning and night      PREMARIN 0.625 mg tablet TK 1 T PO D  3    linaclotide (LINZESS) 72 mcg Cap Take 1 capsule (72 mcg total) by mouth once daily. 30 capsule 6    pantoprazole (PROTONIX) 40 MG tablet Take 1 tablet (40 mg total) by mouth 2 (two) times daily. 180 tablet 3     No current facility-administered medications for this visit.         Objective Findings:  Vital Signs:  /81   Pulse 80   Ht 5' (1.524 m)   Wt 72 kg (158 lb 11.7 oz)   BMI 31.00 kg/m²   Body mass index is 31 kg/m².    Physical Exam:  General appearance: alert, cooperative, no distress  HENT: Normocephalic, atraumatic, neck symmetrical, no nasal discharge  Eyes: conjunctivae/corneas clear, PERRL, EOM's intact  Lungs: clear to auscultation bilaterally, no dullness to percussion bilaterally  Heart: regular rate and rhythm without rub; no displacement of the PMI  Abdomen: soft, non-tender; bowel sounds normoactive; no organomegaly  Extremities: extremities symmetric; no clubbing, cyanosis, or edema  Integument: Skin color, texture, turgor normal; no rashes; hair distrubution normal  Neurologic: Alert and oriented X 3, normal strength, normal coordination and gait  Psychiatric: no pressured speech; normal affect; no evidence of impaired cognition    Labs:  No results found for: WBC, HGB, HCT, MCV, PLT  No results found for: FERRITIN  No results found for: NA, K, CL, CO2, GLU, BUN, CREATININE, CALCIUM, PROT, ALBUMIN, BILITOT, ALKPHOS, AST, ALT  No results found for: TSH  No results found for: SEDRATE  No results found for: CRP  No results found for: LABA1C, HGBA1C        Assessment and Plan:  Amber Gomez is a 62 y.o. female with a PMH of HLD, OA hips, kidney CA s/p partial left nephrectomy, WALKER, osteoporosis referred to motility clinic for second opinion regarding the following problems:  "second opinion for GERD, dysphagia, did " "not respond to botox treatment"     Dysphagia.  Regurgitation of foamy liquid.  Eckardt Score 3.  Symptoms present for >20 years. Conflicting results on previous manometry (NGA, low IRP (2017) and NGA w high IRP (2015). Esophagram with barium tablet hung up in GEJ.  Diverticulum, HH and tortuous esophagus on imaging.  Clinical presentation concerning for achalasia, possibly end stage.   Recent manometry w either GEJOO/NGA or Type III achalasia s/p Botox.  No evidence of resistance at GEJ on EGD.  Prelim esophagogram w obstruction at GEJ  No improvement with GEJ dilation   No improvement with botox (2/2018).   Some improvement with diltiazem  -Esophagogram pending   -Final manometry pending    -Check CT chest to r/o obstruction at GEJ/malignancy  -Check EUS to r/o infiltrative process at GEJ/abnormal vasculature + 20mmHg GEJ dilation if EUS negative    -Discussed pathophysiology, presentation and treatment of achalasia, NGA, GEJOO.  Pt hesitant to consider any invasive options at this time.  Will treat medically as diagnosis unclear a this time    -Avoid triggers: cold fluids, caffeine, smoking, ETOH  -Soft foods and liquids as they trigger fewer symptoms  -Pantoprazole 40mg BID   -Increase diltiazem 60 mg QID.   -Try peppermint oil TID prn  -Will consider isosorbide, SL nitroglycerin, sildenafil, trazodone  -Will consider repeat esophageal manometry (off medication) and esophagogram in 6 months (>12 months after botox)  -Will review in multidisciplinary swallow conference   -Will consider myotomy/POEM/PD pending above     Nausea.  No vomiting. Early satiety.  Improved    -Small frequent meals     Abdominal pain. Epigastric  -Increase pantoprazole 40mg BID   -Will consider FDguard      Gas and bloating. Distention. Belching  Avoids artificial sweeteners  -Eliminate lactose  -Will consider SIBO testing     Constipation.   Unable to tolerate high dose Linzess due to severe diarrhea  Some improvement with colace  -Start " taking Linzess 72mcg with breakfast every day (previoulsy taken prn without meals).  Increase to two pills 72 pills if no improvement      Weight loss. 20 lb wt loss over a few months  last year associated with kidney CA. Recently weight stable    Follow-up in about 3 months (around 11/10/2018) for Radha Kraft NP, 6 months w Dr. Em. (w manometry and esophagogram in 6 months)       1. Dysphagia, unspecified type    2. Nausea    3. Bloating    4. Constipation, unspecified constipation type    5. Early satiety    6. Lung nodule          Order summary:  Orders Placed This Encounter    CT Chest With Contrast    CBC auto differential    Comprehensive metabolic panel    TSH    T4, free    Tissue transglutaminase, IgA    IgA    linaclotide (LINZESS) 72 mcg Cap    pantoprazole (PROTONIX) 40 MG tablet         Thank you so much for allowing me to participate in the care of Amber Em MD

## 2018-08-10 NOTE — TELEPHONE ENCOUNTER
Dr. Oakes and Hermila  PT with GEJOO vs achalasia  Could you please arrange EUS to r/o infiltrative process at GEJ/abnormal vasculature.  Perform 20mmHg TTS GEJ dilation if EUS negative.  -Pls coordinate with CT chest

## 2018-08-10 NOTE — TELEPHONE ENCOUNTER
----- Message from Katlyn Em MD sent at 8/10/2018  3:59 PM CDT -----  Pls let pt knwo that her labs show low TSH (thyroid test).  She needs to fu w her PCP to decide if this requires treatment.  Pls fax results to PCP

## 2018-08-12 ENCOUNTER — PATIENT MESSAGE (OUTPATIENT)
Dept: GASTROENTEROLOGY | Facility: CLINIC | Age: 63
End: 2018-08-12

## 2018-08-13 NOTE — TELEPHONE ENCOUNTER
Message   Received: Today   Message Contents   MD Katlyn Diaz MD; Dana Alfaro MA   Caller: Unspecified (3 days ago, 10:58 AM)             Ok, sounds good

## 2018-08-14 ENCOUNTER — TELEPHONE (OUTPATIENT)
Dept: GASTROENTEROLOGY | Facility: CLINIC | Age: 63
End: 2018-08-14

## 2018-08-14 NOTE — TELEPHONE ENCOUNTER
Discussed at swallow conference: possible heller myotomy (long) w/ toupet fundoplication. then poem if no improvement.

## 2018-08-16 ENCOUNTER — TELEPHONE (OUTPATIENT)
Dept: ENDOSCOPY | Facility: HOSPITAL | Age: 63
End: 2018-08-16

## 2018-09-06 ENCOUNTER — PATIENT MESSAGE (OUTPATIENT)
Dept: GASTROENTEROLOGY | Facility: CLINIC | Age: 63
End: 2018-09-06

## 2018-09-07 DIAGNOSIS — R13.19 ESOPHAGEAL DYSPHAGIA: ICD-10-CM

## 2018-09-07 DIAGNOSIS — R13.10 ODYNOPHAGIA: ICD-10-CM

## 2018-09-07 RX ORDER — DILTIAZEM HYDROCHLORIDE 30 MG/1
TABLET, FILM COATED ORAL
Qty: 120 TABLET | Refills: 0 | Status: SHIPPED | OUTPATIENT
Start: 2018-09-07 | End: 2018-10-06 | Stop reason: SDUPTHER

## 2018-10-06 DIAGNOSIS — R13.10 ODYNOPHAGIA: ICD-10-CM

## 2018-10-06 DIAGNOSIS — R13.19 ESOPHAGEAL DYSPHAGIA: ICD-10-CM

## 2018-10-09 RX ORDER — DILTIAZEM HYDROCHLORIDE 30 MG/1
TABLET, FILM COATED ORAL
Qty: 120 TABLET | Refills: 0 | Status: SHIPPED | OUTPATIENT
Start: 2018-10-09 | End: 2018-11-07 | Stop reason: SDUPTHER

## 2018-10-18 ENCOUNTER — PATIENT MESSAGE (OUTPATIENT)
Dept: GASTROENTEROLOGY | Facility: CLINIC | Age: 63
End: 2018-10-18

## 2018-10-19 ENCOUNTER — TELEPHONE (OUTPATIENT)
Dept: GASTROENTEROLOGY | Facility: CLINIC | Age: 63
End: 2018-10-19

## 2018-10-19 NOTE — TELEPHONE ENCOUNTER
Spoke with pt to schedule f/u. Pt declined appt and would like to continue to f/u w/ Dr. Em via portal.

## 2018-11-07 DIAGNOSIS — R13.10 ODYNOPHAGIA: ICD-10-CM

## 2018-11-07 DIAGNOSIS — R13.19 ESOPHAGEAL DYSPHAGIA: ICD-10-CM

## 2018-11-08 RX ORDER — DILTIAZEM HYDROCHLORIDE 30 MG/1
TABLET, FILM COATED ORAL
Qty: 120 TABLET | Refills: 0 | Status: SHIPPED | OUTPATIENT
Start: 2018-11-08 | End: 2018-12-08 | Stop reason: SDUPTHER

## 2018-12-08 DIAGNOSIS — R13.10 ODYNOPHAGIA: ICD-10-CM

## 2018-12-08 DIAGNOSIS — R13.19 ESOPHAGEAL DYSPHAGIA: ICD-10-CM

## 2018-12-10 RX ORDER — DILTIAZEM HYDROCHLORIDE 30 MG/1
TABLET, FILM COATED ORAL
Qty: 120 TABLET | Refills: 0 | Status: SHIPPED | OUTPATIENT
Start: 2018-12-10 | End: 2019-01-09 | Stop reason: SDUPTHER

## 2019-01-09 DIAGNOSIS — R13.10 ODYNOPHAGIA: ICD-10-CM

## 2019-01-09 DIAGNOSIS — R13.19 ESOPHAGEAL DYSPHAGIA: ICD-10-CM

## 2019-01-09 RX ORDER — DILTIAZEM HYDROCHLORIDE 30 MG/1
TABLET, FILM COATED ORAL
Qty: 120 TABLET | Refills: 0 | Status: SHIPPED | OUTPATIENT
Start: 2019-01-09 | End: 2019-02-05 | Stop reason: SDUPTHER

## 2019-02-05 DIAGNOSIS — R13.19 ESOPHAGEAL DYSPHAGIA: ICD-10-CM

## 2019-02-05 DIAGNOSIS — R13.10 ODYNOPHAGIA: ICD-10-CM

## 2019-02-05 RX ORDER — DILTIAZEM HYDROCHLORIDE 30 MG/1
TABLET, FILM COATED ORAL
Qty: 120 TABLET | Refills: 0 | Status: SHIPPED | OUTPATIENT
Start: 2019-02-05 | End: 2019-03-09 | Stop reason: SDUPTHER

## 2019-03-09 DIAGNOSIS — R13.19 ESOPHAGEAL DYSPHAGIA: ICD-10-CM

## 2019-03-09 DIAGNOSIS — R13.10 ODYNOPHAGIA: ICD-10-CM

## 2019-03-11 RX ORDER — DILTIAZEM HYDROCHLORIDE 30 MG/1
TABLET, FILM COATED ORAL
Qty: 120 TABLET | Refills: 0 | Status: SHIPPED | OUTPATIENT
Start: 2019-03-11 | End: 2019-04-02 | Stop reason: SDUPTHER

## 2019-04-02 DIAGNOSIS — R13.10 ODYNOPHAGIA: ICD-10-CM

## 2019-04-02 DIAGNOSIS — R13.19 ESOPHAGEAL DYSPHAGIA: ICD-10-CM

## 2019-04-02 RX ORDER — DILTIAZEM HYDROCHLORIDE 30 MG/1
TABLET, FILM COATED ORAL
Qty: 120 TABLET | Refills: 0 | Status: SHIPPED | OUTPATIENT
Start: 2019-04-02 | End: 2019-05-06 | Stop reason: SDUPTHER

## 2019-04-23 ENCOUNTER — PATIENT MESSAGE (OUTPATIENT)
Dept: GASTROENTEROLOGY | Facility: CLINIC | Age: 64
End: 2019-04-23

## 2019-05-06 DIAGNOSIS — R13.10 ODYNOPHAGIA: ICD-10-CM

## 2019-05-06 DIAGNOSIS — R13.19 ESOPHAGEAL DYSPHAGIA: ICD-10-CM

## 2019-05-06 RX ORDER — DILTIAZEM HYDROCHLORIDE 30 MG/1
TABLET, FILM COATED ORAL
Qty: 120 TABLET | Refills: 0 | Status: SHIPPED | OUTPATIENT
Start: 2019-05-06 | End: 2019-06-08 | Stop reason: SDUPTHER

## 2019-06-08 DIAGNOSIS — R13.19 ESOPHAGEAL DYSPHAGIA: ICD-10-CM

## 2019-06-08 DIAGNOSIS — R13.10 ODYNOPHAGIA: ICD-10-CM

## 2019-06-10 RX ORDER — DILTIAZEM HYDROCHLORIDE 30 MG/1
TABLET, FILM COATED ORAL
Qty: 120 TABLET | Refills: 1 | Status: SHIPPED | OUTPATIENT
Start: 2019-06-10 | End: 2022-11-02 | Stop reason: SDUPTHER

## 2019-10-21 ENCOUNTER — TELEPHONE (OUTPATIENT)
Dept: GASTROENTEROLOGY | Facility: CLINIC | Age: 64
End: 2019-10-21

## 2019-10-21 RX ORDER — PANTOPRAZOLE SODIUM 40 MG/1
TABLET, DELAYED RELEASE ORAL
Qty: 180 TABLET | Refills: 0 | OUTPATIENT
Start: 2019-10-21

## 2019-10-21 NOTE — TELEPHONE ENCOUNTER
Spoke with Ms. Negretey and let her know that Dr. Em unable to refill her Protonix since we have not seen her recently. She can definitely speak with her local GI about refilling her medication. Cori Munroe RN

## 2019-10-21 NOTE — TELEPHONE ENCOUNTER
pls let pt know I did not refill PPI bc we have not seen her for > 1 year.  She needs to fu w TT or ref GI can fill this for her

## 2019-10-24 ENCOUNTER — HISTORICAL (OUTPATIENT)
Dept: RADIOLOGY | Facility: HOSPITAL | Age: 64
End: 2019-10-24

## 2019-10-31 ENCOUNTER — HISTORICAL (OUTPATIENT)
Dept: RADIOLOGY | Facility: HOSPITAL | Age: 64
End: 2019-10-31

## 2020-05-07 ENCOUNTER — HISTORICAL (OUTPATIENT)
Dept: RADIOLOGY | Facility: HOSPITAL | Age: 65
End: 2020-05-07

## 2020-08-18 ENCOUNTER — HISTORICAL (OUTPATIENT)
Dept: ADMINISTRATIVE | Facility: HOSPITAL | Age: 65
End: 2020-08-18

## 2020-08-25 ENCOUNTER — HISTORICAL (OUTPATIENT)
Dept: ADMINISTRATIVE | Facility: HOSPITAL | Age: 65
End: 2020-08-25

## 2020-08-25 LAB
BUN SERPL-MCNC: 17.4 MG/DL (ref 9.8–20.1)
CALCIUM SERPL-MCNC: 9.1 MG/DL (ref 8.4–10.2)
CHLORIDE SERPL-SCNC: 99 MMOL/L (ref 98–107)
CO2 SERPL-SCNC: 29 MMOL/L (ref 23–31)
CREAT SERPL-MCNC: 0.87 MG/DL (ref 0.55–1.02)
CREAT/UREA NIT SERPL: 20
ERYTHROCYTE [DISTWIDTH] IN BLOOD BY AUTOMATED COUNT: 13.9 % (ref 11.5–17)
GLUCOSE SERPL-MCNC: 119 MG/DL (ref 82–115)
HCT VFR BLD AUTO: 38.7 % (ref 37–47)
HGB BLD-MCNC: 12.4 GM/DL (ref 12–16)
MCH RBC QN AUTO: 29.7 PG (ref 27–31)
MCHC RBC AUTO-ENTMCNC: 32 GM/DL (ref 33–36)
MCV RBC AUTO: 92.8 FL (ref 80–94)
PLATELET # BLD AUTO: 292 X10(3)/MCL (ref 130–400)
PMV BLD AUTO: 9.8 FL (ref 9.4–12.4)
POTASSIUM SERPL-SCNC: 3.5 MMOL/L (ref 3.5–5.1)
RBC # BLD AUTO: 4.17 X10(6)/MCL (ref 4.2–5.4)
SODIUM SERPL-SCNC: 138 MMOL/L (ref 136–145)
WBC # SPEC AUTO: 9.1 X10(3)/MCL (ref 4.5–11.5)

## 2020-08-28 ENCOUNTER — HOSPITAL ENCOUNTER (OUTPATIENT)
Dept: ONCOLOGY | Facility: HOSPITAL | Age: 65
End: 2020-08-29
Attending: ORTHOPAEDIC SURGERY | Admitting: ORTHOPAEDIC SURGERY

## 2020-08-28 LAB — GROUP & RH: NORMAL

## 2020-08-29 LAB
ABS NEUT (OLG): 7.06 X10(3)/MCL (ref 2.1–9.2)
ANISOCYTOSIS BLD QL SMEAR: 1
BUN SERPL-MCNC: 14.6 MG/DL (ref 9.8–20.1)
CALCIUM SERPL-MCNC: 7.3 MG/DL (ref 8.4–10.2)
CHLORIDE SERPL-SCNC: 98 MMOL/L (ref 98–107)
CO2 SERPL-SCNC: 28 MMOL/L (ref 23–31)
CREAT SERPL-MCNC: 0.77 MG/DL (ref 0.55–1.02)
CREAT/UREA NIT SERPL: 19
ERYTHROCYTE [DISTWIDTH] IN BLOOD BY AUTOMATED COUNT: 13.7 % (ref 11.5–17)
GLUCOSE SERPL-MCNC: 110 MG/DL (ref 82–115)
HCT VFR BLD AUTO: 26.6 % (ref 37–47)
HGB BLD-MCNC: 8.6 GM/DL (ref 12–16)
LYMPHOCYTES NFR BLD MANUAL: 10 % (ref 13–40)
MACROCYTES BLD QL SMEAR: 1 /MCL
MCH RBC QN AUTO: 30.4 PG (ref 27–31)
MCHC RBC AUTO-ENTMCNC: 32.3 GM/DL (ref 33–36)
MCV RBC AUTO: 94 FL (ref 80–94)
MONOCYTES NFR BLD MANUAL: 2 % (ref 2–11)
NEUTROPHILS NFR BLD MANUAL: 88 % (ref 47–80)
PLATELET # BLD AUTO: 201 X10(3)/MCL (ref 130–400)
PLATELET # BLD EST: ADEQUATE 10*3/UL
PMV BLD AUTO: 9.4 FL (ref 9.4–12.4)
POTASSIUM SERPL-SCNC: 3 MMOL/L (ref 3.5–5.1)
RBC # BLD AUTO: 2.83 X10(6)/MCL (ref 4.2–5.4)
RBC MORPH BLD: ABNORMAL
SODIUM SERPL-SCNC: 133 MMOL/L (ref 136–145)
WBC # SPEC AUTO: 9.3 X10(3)/MCL (ref 4.5–11.5)

## 2020-10-08 ENCOUNTER — HISTORICAL (OUTPATIENT)
Dept: ADMINISTRATIVE | Facility: HOSPITAL | Age: 65
End: 2020-10-08

## 2020-11-19 ENCOUNTER — HISTORICAL (OUTPATIENT)
Dept: ADMINISTRATIVE | Facility: HOSPITAL | Age: 65
End: 2020-11-19

## 2020-12-22 ENCOUNTER — HISTORICAL (OUTPATIENT)
Dept: ADMINISTRATIVE | Facility: HOSPITAL | Age: 65
End: 2020-12-22

## 2021-04-15 LAB
BUN SERPL-MCNC: 19 MG/DL (ref 7–18)
CALCIUM SERPL-MCNC: 9.2 MG/DL (ref 8.5–10.1)
CHLORIDE SERPL-SCNC: 97 MMOL/L (ref 98–107)
CO2 SERPL-SCNC: 26 MMOL/L (ref 21–32)
CREAT SERPL-MCNC: 0.82 MG/DL (ref 0.6–1.3)
GLUCOSE SERPL-MCNC: 127 MG/DL (ref 74–106)
POTASSIUM SERPL-SCNC: 3.6 MMOL/L (ref 3.5–5.1)
SODIUM SERPL-SCNC: 139 MEQ/L (ref 131–145)

## 2021-05-03 ENCOUNTER — HISTORICAL (OUTPATIENT)
Dept: ANESTHESIOLOGY | Facility: HOSPITAL | Age: 66
End: 2021-05-03

## 2021-05-05 ENCOUNTER — HISTORICAL (OUTPATIENT)
Dept: RADIOLOGY | Facility: HOSPITAL | Age: 66
End: 2021-05-05

## 2022-04-30 NOTE — OP NOTE
DATE OF SURGERY:    08/28/2020    SURGEON:  Boo Quintana MD  ASSISTANT:  Jami Carrera NP    PREOPERATIVE DIAGNOSIS:  Right 4-part proximal humerus fracture.    POSTOPERATIVE DIAGNOSIS:  Right 4-part proximal humerus fracture.    PROCEDURE PERFORMED:  Open reduction and internal fixation of proximal humerus fracture, including tuberosities.    ANESTHESIA:  General.    ESTIMATED BLOOD LOSS:  100 cc.     Nurse practitioner was necessary for a skilled set of hands to assist with reduction of the fracture as well as application of hardware and deep closure.    IMPLANTS:  Synthes 6-hole 2.4 LCP plate as well as a 5-hole 3.5 mm LCP proximal humerus plate.    COMPLICATIONS:  None.    COUNTS:  All counts correct x2 at the end of the case.    INDICATIONS FOR PROCEDURE:  Ms. Gomez is a 64-year-old female who is a patient of my partner, Dr. Aurora Keating.  She was seen and evaluated by Dr. Keating and she asked me to assume care of her fracture due to my earlier operative availability for procedure.  The risks and benefits of treatment were discussed and she was booked for open reduction internal fixation.    PROCEDURE IN DETAIL:  After informed consent was obtained, the patient was met in the preoperative holding area where site was marked.  She was taken to the operating room.  She was placed supine on the operating table.  General anesthesia was induced.  All bony prominences were well padded.  Preoperative antibiotics were given.  The right upper extremity was prepped and draped in a standard sterile fashion.  A timeout was done to indicate the correct operative limb and procedure.  A deltopectoral approach to the proximal humerus was performed.  It was carried down to the level of the fracture site.  Her biceps tendon was identified.  It was tenodesed to the insertion of the pectoralis tendon and tenotomized.  She was noted to have fractures of the greater and lesser tuberosities.  These were secured using #2  FiberWire sutures.  She was pulled out to length.  Her head fracture with elevated.  Provisional K-wires were placed holding it into position, followed by an anterior 2.4 mm LCP plate for provisional stabilization.  We restored her overall alignment.  Norian bone grafting was used to fill in the void left by the impaction.  A 5-hole lateral plate was then applied.  It was positioned appropriately on AP and lateral fluoroscopic images.  It was brought down to bone using a nonlocking screw in the shaft.  Multiple screws were placed into the head.  They were all confirmed to be in appropriate length and position on multiplanar imaging.  Further shaft screws were placed.  She was put through a range of motion and found to be stable.  The tuberosities were tied to the plate using a free needle.  The wound was irrigated and closed using #1 Vicryl, 2-0 Vicryl and staples.  Xeroform and island dressings were applied with abduction sling.  The patient was awakened, extubated, and taken to recovery in stable condition.    POSTOPERATIVE PLAN:  She will be admitted for 23-hour observation, nonweightbearing to the right upper extremity except for ADLs.  She can perform pendulum exercises to the right shoulder and full range of motion of the elbow, wrist and digits.        ______________________________  MD VELVET Ribeiro/  DD:  08/28/2020  Time:  09:32AM  DT:  08/28/2020  Time:  09:45AM  Job #:  [id number]

## 2022-05-02 NOTE — HISTORICAL [facility] CERNER
This is a historical note converted from Sajan. Formatting and pictures may have been removed.  Please reference Sajan for original formatting and attached multimedia. Chief Complaint  Pain to right shoulder osnet this AM when reaching out to grab grandchild  History of Present Illness  Patient is a?64-year-old female?who was?tending to her grandchild and?grandchild fell?she reached out to protect vitals head and?fell on the back of her shoulder and felt a popping sensation and was unable to move her arm due to pain.  Review of Systems  Constitutional_no fever, fatigue, weakness  Cardiovascular_no chest pain, tachycardia, bradycardia, arrhythmia  Respiratory_no shortness of breath, cough, wheezing, rhonchi  Musculoskeletal_right?shoulder and upper arm pain, severity of pain is a 8-9/10  Integumentary_no skin rash or abnormal lesion  Neurologic_no headache, no dizziness, no weakness or numbness  ?  Physical Exam  Vitals & Measurements  T:?36.3? ?C (Oral)? HR:?98(Peripheral)? RR:?18? BP:?118/82? SpO2:?100%?  HT:?152.00?cm? WT:?68.000?kg? BMI:?29.43?  VITAL SIGNS: ?Reviewed. ? ?  GENERAL: ?In no apparent distress. ?  CHEST: ?Chest with clear breath sounds bilaterally. ?No wheezes, rales, or rhonchi. ?  CARDIAC: ?Regular rate and rhythm. ?S1 and S2, without murmurs, gallops, or rubs.  ABDOMEN: ?Soft, without detectable tenderness. ?No sign of distention. ?No ? rebound or guarding, and no masses palpated. ? Bowel Sounds normal.  MUSCULOSKELETAL:?Right?upper arm pain radiating to the shoulder and to the shoulder blade?decreased range of motion due to pain patient does have?good circulation, sensation,?movement, blanching of the right hand but unable to move her arm due to pain?patients radial pulses are +1.?  NEUROLOGIC EXAM: ?Alert and oriented x 3. ?No focal sensory or strength deficits. ? Speech normal. ?Follows commands.  Upon viewing?right humerus x-ray patient has a impacted?proximal?humerus head fracture, pending  radiology overread, will send to Dr. Aurora Keating?known to patient.  Assessment/Plan  1.?Muscle strain of right upper arm?S46.911A,?Muscle strain of right shoulder?S46.911A  Ordered:  traMADol, 50 mg = 1 tab(s), Oral, BID, PRN PRN as needed for pain, X 7 day(s), # 15 tab(s), 0 Refill(s), Pharmacy: [id number], 152, cm, Height/Length Dosing, 08/18/20 10:08:00 CDT, 68, kg, Weight Dosing, 08/18/20 10:08:00 CDT  Arm Sling  PC, 08/18/20 10:31:00 CDT, [facility]CC-RR, Routine, 08/18/20 10:31:00 CDT, Muscle strain of right upper arm  Office/Outpatient Visit Level 3 Established [id number] PC, Muscle strain of right upper arm, UCC-RR, 08/18/20 10:30:00 CDT  XR Humerus Right 2 Views, Routine, 08/18/20 10:36:00 CDT, None, Ambulatory, Patient Has IV?, Rad Type, Muscle strain of right upper arm, Not Scheduled, 08/18/20 10:36:00 CDT  ?  3.?Fracture of humeral head, right, closed?S42.291A  ?  Instructed patient to start tramadol?50 mg p.o. twice daily as needed pain.  Instructed patient to wear arm sling to immobilize arm?until seen by Dr. Aurora Keating at 2:30 p.m.?today,?Dr. Keating assume care.  Referrals  Ambulatory Referral, Specialty: Orthopedic Surgery, Reason: Right Proximal Humeral Head fracture, Type: 08/18/2020, Start: 08/18/20 10:53:00 CDT, Instructions: Dr. Leach, Muscle strain of right upper arm   Problem List/Past Medical History  Ongoing  Arthritis  DDD (degenerative disc disease), cervical  Diverticulitis  Dysphagia  Elevated cholesterol  Esophageal dysmotility  GERD - Gastro-esophageal reflux disease  h/o esopageal dilation  Osteoporosis  Thyroid nodule  Historical  blood clot 30 y ago  Hiatal hernia  Obesity  Procedure/Surgical History  Biopsy, breast, with placement of breast localization device(s) (eg, clip, metallic pellet), when performed, and imaging of the biopsy specimen, when performed, percutaneous; first lesion, including stereotactic guidance (10/31/2019)  Excision of Chest Skin, External  Approach, Diagnostic (10/31/2019)  Esophagogastroduodenoscopy (02/07/2018)  Esophagogastroduodenoscopy, flexible, transoral; with directed submucosal injection(s), any substance (02/07/2018)  Introduction of Other Therapeutic Substance into Upper GI, Via Natural or Artificial Opening Endoscopic (02/07/2018)  Sclerotherapy (02/07/2018)  Carpal Tunnel Release (Left) (02/04/2016)  Neuroplasty and/or transposition; median nerve at carpal tunnel (02/04/2016)  Release Median Nerve, Open Approach (02/04/2016)  Esophageal manometry (04/06/2015)  Esophageal Motility (04/06/2015)  Esophageal motility (manometric study of the esophagus and/or gastroesophageal junction) study with interpretation and report; with high resolution esophageal pressure topography (04/06/2015)  Cholecystectomy Laparoscopic (None) (10/11/2013)  Laparoscopic cholecystectomy (10/11/2013)  Laparoscopy, surgical; cholecystectomy. (10/11/2013)  esophageal dilation (09/20/2013)  Acromioplasty or acromionectomy, partial, with or without coracoacromial ligament release. (05/31/2012)  Other repair of shoulder (05/31/2012)  Rotator cuff (05/31/2012)  removal of bone spur right heel and rattachment of achilles tendon (2011)  right arm nerve decompression (2011)  back surgery (2010)  sinus surgery (2010)  CTR with removal of bone spur (2007)  right shoulder repair (2006)  Hysterectomy (1998)  repair meniscus tear (1998)  Tonsillectomy (1969)  History of partial nephrectomy  Laparoscopy, surgical; cholecystectomy   Medications  Aleve 220 mg oral tablet, 1 cap(s), Oral, q12hr, PRN  alPRAzolam 0.25 mg oral tab, 0.25 mg= 1 tab(s), Oral, TID  benzonatate 200 mg oral capsule, See Instructions, 11 refills  Celebrate Vitamin D3 Quick-Melt 5000 intl units oral tablet, disintegrating, 5000 IntUnit= 1 tab(s), Oral, Daily  Crestor 40 mg oral tablet, 40 mg= 1 tab(s), Oral, Daily  diclofenac sodium 50 mg oral delayed release tablet, 50 mg= 1 tab(s), Oral, BID, PRN, 1  refills  dilTIAZem 30 mg oral tablet, 30 mg= 1 tab(s), Oral, QID, 6 refills  fluticasone 50 mcg/inh nasal spray, 2 spray(s), Nasal, Daily  hydrochlorothiazide 50 mg oral tablet, 50 mg= 1 tab(s), Oral, Daily  methocarbamol 750 mg oral tablet, 375 mg= 0.5 tab(s), Oral, At Bedtime, PRN  montelukast 10 mg oral TABLET, 10 mg= 1 tab(s), Oral, Daily, 2 refills  oxybutynin 5 mg oral tablet, 5 mg= 1 tab(s), Oral, BID, PRN  Pantoprazole 40 mg ORAL EC-Tablet, 40 mg= 1 tab(s), Oral, BID, 3 refills  potassium chloride 20 mEq/15 mL oral liquid, 20 mEq= 15 mL, Oral, BID  Premarin 0.625 mg oral tablet, See Instructions, 6 refills  Ultram 50 mg oral tablet, 50 mg= 1 tab(s), Oral, BID, PRN  Vascepa 1 g oral capsule, 2 gm= 2 cap(s), Oral, BID  Allergies  Benadryl?(Adverse reaction)  Lortab?(weird feeling)  Social History  Abuse/Neglect  No, 08/18/2020  Alcohol - Medium Risk, 04/06/2015  Current, Beer, Liquor, 1-2 times per week, 09/26/2019  Employment/School  Retired, 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 Living situation: Home/Independent., 09/26/2019  Nutrition/Health  Regular, 09/26/2019  Substance Use - Denies Substance Abuse, 04/06/2015  Never, 09/26/2019  Tobacco - Denies Tobacco Use, 05/29/2012  Never (less than 100 in lifetime), N/A, 08/18/2020  Family History  Asthma.: Brother.  Congestive heart disease.: Mother and Father.  Diabetes: Father.  Diabetes mellitus type 2: Mother.  Dialysis: Negative: Brother.  Glaucoma.: Mother.  Heart disease: Father.  Ileostomy - stoma: Brother.  Multiple sclerosis: Sister.  Prostate cancer.: Brother.  Renal failure syndrome.: Mother.  Stroke: Father.  Immunizations  Vaccine Date Status   zoster vaccine, inactivated 01/17/2020 Recorded   tetanus/diphtheria/pertussis, acel(Tdap) 11/10/2019 Recorded   influenza virus vaccine, inactivated 09/30/2019 Recorded   influenza virus  vaccine, inactivated 09/23/2017 Recorded   influenza virus vaccine, inactivated 09/25/2015 Recorded   influenza virus vaccine, inactivated 10/16/2013 Recorded   Health Maintenance  Health Maintenance  ???Pending?(in the next year)  ??? ??OverDue  ??? ? ? ?Alcohol Misuse Screening due??01/02/20??and every 1??year(s)  ??? ??Due?  ??? ? ? ?ADL Screening due??08/18/20??and every 1??year(s)  ??? ? ? ?Aspirin Therapy for CVD Prevention due??08/18/20??and every 1??year(s)  ??? ? ? ?Cervical Cancer Screening due??08/18/20??and every?  ??? ? ? ?Colorectal Screening due??08/18/20??and every?  ??? ? ? ?Depression Screening due??08/18/20??and every?  ??? ? ? ?Influenza Vaccine due??08/18/20??and every?  ??? ? ? ?Zoster Vaccine due??08/18/20??and every?  ??? ??Due In Future?  ??? ? ? ?Obesity Screening not due until??01/01/21??and every 1??year(s)  ???Satisfied?(in the past 1 year)  ??? ??Satisfied?  ??? ? ? ?Alcohol Misuse Screening on??09/26/19.??Satisfied by Priyanka Dover LPN  ??? ? ? ?Blood Pressure Screening on??08/18/20.??Satisfied by Stephen Calderon LPN  ??? ? ? ?Body Mass Index Check on??08/18/20.??Satisfied by Stephen Calderon LPN  ??? ? ? ?Breast Cancer Screening on??10/24/19.??Satisfied by Hubert Ring  ??? ? ? ?Influenza Vaccine on??09/30/19.??Satisfied by Priyanka Dover LPN.  ??? ? ? ?Lipid Screening on??09/27/19.??Satisfied by Contributor[facility]system, [email or web address]  ??? ? ? ?Obesity Screening on??08/18/20.??Satisfied by Yumiko BOWMAN, Stephen  ??? ? ? ?Tetanus Vaccine on??11/10/19.??Satisfied by Priyanka Dover LPN  ??? ? ? ?Zoster Vaccine on??01/17/20.??Satisfied by Priyanka Dover LPN  ?

## 2022-05-02 NOTE — HISTORICAL [facility] CERNER
This is a historical note converted from Sajan. Formatting and pictures may have been removed.  Please reference Sajan for original formatting and attached multimedia. Chief Complaint  1 WEEK F/U IMPACTED HUMERUS FX, IN SLING, HAVING SOME PAIN  History of Present Illness  Patient here today for a one-week follow-up?right?proximal humerus fracture. She was seen initially by my partner Dr. Aurora Keating and I assume care of her injury?due to my earlier operative availability.?She has had some displacement of her fracture and is here to discuss?operative intervention.?She has some ecchymosis and swelling?in her upper arm. She has been in a sling.?Pain is well-controlled.  Review of Systems  ?  Constitutional: negative except as stated in HPI  HEENT: negative except as stated in HPI  Respiratory: negative except as stated in HPI  Cardiovascular: negative except as stated in HPI  Gastrointestinal: negative except as stated in HPI  Genitourinary: negative except as stated in HPI  Heme/Lymph: negative except as stated in HPI  Musculoskeletal: negative except as stated in HPI  Integumentary: negative except as stated in HPI  Neurologic: negative except as stated in HPI  ?   All Other ROS_ negative except as stated in HPI  ?  Physical Exam  Vitals & Measurements  T:?37? ?C (Oral)? HR:?84(Peripheral)? RR:?20? BP:?114/81?  HT:?152.00?cm? WT:?68.000?kg? BMI:?29.43?  ?  GEN: Well appearing. Awake, alert and oriented. ?In no distress.  ?  HEENT: NCAT, EOMI  ?  CV: Normal rhythm, regular rate, normal peripheral perfusion  ?  PULM: Unlabored respirations with symmetric chest rise. No SOB noted  ?  ABD: Soft, nontender, nondistended  ?  Integument: Clean and dry, no open wounds or lesions  ?  Right upper extremity:?Ecchymosis noted to the medial aspect of her upper arm. No open wounds. No attempted range of motion of the shoulders performed. She has some soreness in the elbow?though she does have good range of motion.?She has a  palpable radial pulse. Intact EPL/FPL, EDC/FDP and interossei.?Sensation light touch intact distally.  ?  Assessment/Plan  4-part fracture of surgical neck of right humerus, initial encounter for closed fracture?S42.241A  Ordered:  Office/Outpatient Visit Level 3 Established [id number] PC, 4-part fracture of surgical neck of right humerus, initial encounter for closed fracture, [facility], 08/25/20 14:21:00 CDT  ?  Orders:  Basic Metabolic Panel, NOW collect, 08/25/20 13:52:47 CDT, BLOOD, Collected, Stop date 08/25/20 13:52:00 CDT, Lab Collect  CBC wo/Diff, NOW collect, 08/25/20 13:52:47 CDT, BLOOD, Collected, Stop date 08/25/20 13:52:00 CDT, Lab Collect  ?  ?  The risks, benefits and alternatives to operative intervention were discussed with the patient today including but not limited to pain, bleeding, scarring, stiffness, infection, damage to neurovascular structures, malunion/nonunion, need for future procedures and complications leading to amputation and even death. Well plan to take her to the operating room?on Friday for open reduction internal fixation of her right proximal humerus fracture. Due to the amount of displacement?since her initial evaluation of this would benefit her?in the future. She is still at risk for developing?stiffness and limitations in range of motion?the shoulder. She and her  understand and agree with our plan today and all questions and concerns were addressed.  ?????  ?  ?  ?   Problem List/Past Medical History  Ongoing  DDD (degenerative disc disease), cervical  Displaced fracture of proximal end of right humerus  Diverticulitis  Dysphagia  Elevated cholesterol  Esophageal dysmotility  GERD - Gastro-esophageal reflux disease  h/o esopageal dilation  Osteoporosis  Thyroid nodule  Historical  blood clot 30 y ago  Displaced fracture of proximal end of left humerus  Hiatal hernia  Obesity  Procedure/Surgical History  Biopsy, breast, with placement of breast localization  device(s) (eg, clip, metallic pellet), when performed, and imaging of the biopsy specimen, when performed, percutaneous; first lesion, including stereotactic guidance (10/31/2019)  Excision of Chest Skin, External Approach, Diagnostic (10/31/2019)  Esophagogastroduodenoscopy (02/07/2018)  Esophagogastroduodenoscopy, flexible, transoral; with directed submucosal injection(s), any substance (02/07/2018)  Introduction of Other Therapeutic Substance into Upper GI, Via Natural or Artificial Opening Endoscopic (02/07/2018)  Sclerotherapy (02/07/2018)  Carpal Tunnel Release (Left) (02/04/2016)  Neuroplasty and/or transposition; median nerve at carpal tunnel (02/04/2016)  Release Median Nerve, Open Approach (02/04/2016)  Esophageal manometry (04/06/2015)  Esophageal Motility (04/06/2015)  Esophageal motility (manometric study of the esophagus and/or gastroesophageal junction) study with interpretation and report; with high resolution esophageal pressure topography (04/06/2015)  Cholecystectomy Laparoscopic (None) (10/11/2013)  Laparoscopic cholecystectomy (10/11/2013)  Laparoscopy, surgical; cholecystectomy. (10/11/2013)  esophageal dilation (09/20/2013)  Acromioplasty or acromionectomy, partial, with or without coracoacromial ligament release. (05/31/2012)  Other repair of shoulder (05/31/2012)  Rotator cuff (05/31/2012)  removal of bone spur right heel and rattachment of achilles tendon (2011)  right arm nerve decompression (2011)  back surgery (2010)  sinus surgery (2010)  CTR with removal of bone spur (2007)  right shoulder repair (2006)  Hysterectomy (1998)  repair meniscus tear (1998)  Tonsillectomy (1969)  History of partial nephrectomy  Laparoscopy, surgical; cholecystectomy   Medications  Aleve 220 mg oral tablet, 1 cap(s), Oral, q12hr, PRN  alPRAzolam 0.25 mg oral tab, 0.25 mg= 1 tab(s), Oral, TID  benzonatate 200 mg oral capsule, See Instructions, 11 refills  Celebrate Vitamin D3 Quick-Melt 5000 intl units oral  tablet, disintegrating, 5000 IntUnit= 1 tab(s), Oral, Daily  Crestor 40 mg oral tablet, 40 mg= 1 tab(s), Oral, Daily  diclofenac sodium 50 mg oral delayed release tablet, 50 mg= 1 tab(s), Oral, BID, PRN, 1 refills  diclofenac sodium 75 mg oral delayed release tablet, 75 mg= 1 tab(s), Oral, BID  dilTIAZem 30 mg oral tablet, 30 mg= 1 tab(s), Oral, QID, 6 refills  DULoxetine 30 mg oral delayed release capsule, 30 mg= 1 cap(s), Oral, Daily  fluticasone 50 mcg/inh nasal spray, 2 spray(s), Nasal, Daily  hydrochlorothiazide 50 mg oral tablet, 50 mg= 1 tab(s), Oral, Daily  methocarbamol 500 mg oral tablet, 500 mg= 1 tab(s), Oral, BID  methocarbamol 750 mg oral tablet, 375 mg= 0.5 tab(s), Oral, At Bedtime, PRN  montelukast 10 mg oral TABLET, 10 mg= 1 tab(s), Oral, Daily, 2 refills  oxybutynin 5 mg oral tablet, 5 mg= 1 tab(s), Oral, BID, PRN  Pantoprazole 40 mg ORAL EC-Tablet, 40 mg= 1 tab(s), Oral, BID, 3 refills  potassium chloride 20 mEq/15 mL oral liquid, 20 mEq= 15 mL, Oral, BID  Premarin 0.625 mg oral tablet, See Instructions, 6 refills  spironolactone 25 mg oral tablet, 25 mg= 1 tab(s), Oral, BID  Vascepa 1 g oral capsule, 2 gm= 2 cap(s), Oral, BID  Allergies  Benadryl?(Adverse reaction)  Lortab?(weird feeling)  Social History  Abuse/Neglect  No, 08/25/2020  Alcohol - Medium Risk, 04/06/2015  Current, Beer, Liquor, 1-2 times per week, 09/26/2019  Employment/School  Retired, 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 Living situation: Home/Independent., 09/26/2019  Nutrition/Health  Regular, 09/26/2019  Substance Use - Denies Substance Abuse, 04/06/2015  Never, 09/26/2019  Tobacco - Denies Tobacco Use, 05/29/2012  Never (less than 100 in lifetime), N/A, 08/25/2020  Family History  Asthma.: Brother.  Congestive heart disease.: Mother and Father.  Diabetes: Father.  Diabetes mellitus type 2: Mother.  Dialysis:  Negative: Brother.  Glaucoma.: Mother.  Heart disease: Father.  Ileostomy - stoma: Brother.  Multiple sclerosis: Sister.  Prostate cancer.: Brother.  Renal failure syndrome.: Mother.  Stroke: Father.  Immunizations  Vaccine Date Status   zoster vaccine, inactivated 01/17/2020 Recorded   tetanus/diphtheria/pertussis, acel(Tdap) 11/10/2019 Recorded   influenza virus vaccine, inactivated 09/30/2019 Recorded   influenza virus vaccine, inactivated 09/23/2017 Recorded   influenza virus vaccine, inactivated 09/25/2015 Recorded   influenza virus vaccine, inactivated 10/16/2013 Recorded   Health Maintenance  Health Maintenance  ???Pending?(in the next year)  ??? ??OverDue  ??? ? ? ?Alcohol Misuse Screening due??01/02/20??and every 1??year(s)  ??? ??Due?  ??? ? ? ?ADL Screening due??08/25/20??and every 1??year(s)  ??? ? ? ?Aspirin Therapy for CVD Prevention due??08/25/20??and every 1??year(s)  ??? ? ? ?Cervical Cancer Screening due??08/25/20??and every?  ??? ? ? ?Colorectal Screening due??08/25/20??and every?  ??? ? ? ?Depression Screening due??08/25/20??and every?  ??? ? ? ?Influenza Vaccine due??08/25/20??and every?  ??? ? ? ?Zoster Vaccine due??08/25/20??and every?  ??? ??Due In Future?  ??? ? ? ?Obesity Screening not due until??01/01/21??and every 1??year(s)  ???Satisfied?(in the past 1 year)  ??? ??Satisfied?  ??? ? ? ?Alcohol Misuse Screening on??09/26/19.??Satisfied by Priyanka Dover LPN  ??? ? ? ?Blood Pressure Screening on??08/25/20.??Satisfied by Loreto Farr  ??? ? ? ?Body Mass Index Check on??08/25/20.??Satisfied by Loreto Farr  ??? ? ? ?Breast Cancer Screening on??10/24/19.??Satisfied by Hubert Ring  ??? ? ? ?Influenza Vaccine on??09/30/19.??Satisfied by Priyanka Dover LPN  ??? ? ? ?Lipid Screening on??09/27/19.??Satisfied by [facility]system, [email or web address]  ??? ? ? ?Obesity Screening on??08/25/20.??Satisfied by Loreto Farr  ??? ? ? ?Tetanus Vaccine  on??11/10/19.??Satisfied by Priyanka Dover LPN  ??? ? ? ?Zoster Vaccine on??01/17/20.??Satisfied by Priyanka Dover LPN  ?  Diagnostic Results  Right shoulder 2 views:?Displacement of large greater tuberosity fragment right proximal humerus?3 part fracture.

## 2022-05-02 NOTE — HISTORICAL [facility] CERNER
This is a historical note converted from Sajan. Formatting and pictures may have been removed.  Please reference Sajan for original formatting and attached multimedia. Chief Complaint  4 month f/u ORIF R prox humerus fx 8/28/20... pt states she still have slight pain, but it is getting better, no other changes, xr today  History of Present Illness  Patient is here today?for follow-up evaluation 4 months out from open reduction internal fixation of right proximal humerus fracture.? She states she is doing well today.? She met all her goals with physical therapy and was discharged.? She continues to work on exercises at home.? She states that she still has intermittent pain?with?overhead shoulder activity.? She uses over-the-counter?NSAIDs as well as?pain medication?on an as-needed basis.? She does have a history of?shoulder impingement and bursitis.? She states that corticosteroid injections?have helped her in the past, and she is inquiring if she is a candidate for injection today.? She has not had any fever?or any issues with her incision.? She is able to use the arm for activities of daily living without any problems.? She does not report any other complaints or issues today.  Review of Systems  Otherwise negative  Physical Exam  Vitals & Measurements  T:?36.2? ?C (Oral)? HR:?83(Peripheral)? BP:?112/77?  HT:?153.00?cm? WT:?65.000?kg? BMI:?27.77?  General: Awake, alert, oriented. Patient in no acute distress. Well nourished and well perfused.  Musculoskeletal:?  Right upper extremity:  Surgical incision well-healed with no signs of infection  No swelling to the upper extremity. ?Compartments are soft and compressible.  Active forward flexion to about 100 degrees, passive forward flexion to 160 degrees with soreness at the terminal end of motion  Good internal and external shoulder rotation without pain  Full active range of motion of the elbow, wrist, digits  radial pulse 2+  AIN/PIN/ulnar nerves motor  intact  motor intact to digits  sensation to light touch intact distally  brisk capillary refill distally  Assessment/Plan  1.?Displaced fracture of proximal end of right humerus?S42.291D  Ordered:  betamethasone, 12 mg, Intra-Articular, Once, first dose 12/22/20 11:00:00 CST, stop date 12/22/20 11:00:00 CST  Lidocaine inj., 2 mL, Intra-Articular, Once, first dose 12/22/20 11:00:00 CST, stop date 12/22/20 11:00:00 CST  asp/inj jnt/bursa, major [id number] PC, 12/22/20 11:00:00 CST, [facility], Routine, 12/22/20 11:00:00 CST, Displaced fracture of proximal end of right humerus  Clinic Follow-up PRN, 12/22/20 10:59:00 CST, Future Order, [facility]  Office/Outpatient Visit Level 2 Established [id number] PC, Displaced fracture of proximal end of right humerus, [facility], 12/22/20 10:59:00 CST  ?  Patient is now 4 months out from open reduction internal fixation right proximal humerus fracture.? Her fracture is well-healed on x-rays today. ?She can continue activity as tolerated without any formal restrictions. ?She was encouraged to continue to work on stretching and strengthening exercises.? The subacromial?injection?was performed today?for pain relief.??She can continue over-the-counter NSAIDs as needed. ?We do not need any further?x-rays to evaluate fracture healing, so she was advised to follow-up with us on an as-needed basis for any issues or concerns.  ?  After obtaining verbal consent, the right shoulder was identified as correct.? Posterior shoulder was prepped with Betadine.? Under sterile technique,?12 mg of betamethasone and 2 mL of 2% plain lidocaine?was injected?to the posterior portal?using a 22-gauge needle.? A sterile dressing was applied. ?The patient tolerated the procedure well. ?She was encouraged to use ice packs?as needed for soreness and to resume activity as tolerated post injection.  ?  The above findings, diagnostics, and treatment plan were discussed with Dr. Quintana who is in  agreement with the plan of care.?  Referrals  Clinic Follow-up PRN, 12/22/20 10:59:00 CST, Future Order, [facility]   Problem List/Past Medical History  Ongoing  DDD (degenerative disc disease), cervical  Displaced fracture of proximal end of right humerus  Diverticulitis  Dysphagia  Elevated cholesterol  Esophageal dysmotility  GERD - Gastro-esophageal reflux disease  h/o esopageal dilation  Osteoporosis  Sleep apnea  Thyroid nodule  Historical  blood clot 30 y ago  CA - Cancer of kidney  Displaced fracture of proximal end of left humerus  Hiatal hernia  Obesity  Procedure/Surgical History  Injection(s), anesthetic agent(s) and/or steroid; brachial plexus (08/28/2020)  Introduction of Anesthetic Agent into Peripheral Nerves and Plexi, Percutaneous Approach (08/28/2020)  Open treatment of proximal humeral (surgical or anatomical neck) fracture, includes internal fixation, when performed, includes repair of tuberosity(s), when performed; (08/28/2020)  ORIF Humerus Proximal (.) (08/28/2020)  Reposition Right Humeral Head with Internal Fixation Device, Open Approach (08/28/2020)  Biopsy, breast, with placement of breast localization device(s) (eg, clip, metallic pellet), when performed, and imaging of the biopsy specimen, when performed, percutaneous; first lesion, including stereotactic guidance (10/31/2019)  Excision of Chest Skin, External Approach, Diagnostic (10/31/2019)  Esophagogastroduodenoscopy (02/07/2018)  Esophagogastroduodenoscopy, flexible, transoral; with directed submucosal injection(s), any substance (02/07/2018)  Introduction of Other Therapeutic Substance into Upper GI, Via Natural or Artificial Opening Endoscopic (02/07/2018)  Sclerotherapy (02/07/2018)  Carpal Tunnel Release (Left) (02/04/2016)  Neuroplasty and/or transposition; median nerve at carpal tunnel (02/04/2016)  Release Median Nerve, Open Approach (02/04/2016)  Esophageal manometry (04/06/2015)  Esophageal Motility  (04/06/2015)  Esophageal motility (manometric study of the esophagus and/or gastroesophageal junction) study with interpretation and report; with high resolution esophageal pressure topography (04/06/2015)  Cholecystectomy Laparoscopic (None) (10/11/2013)  Laparoscopic cholecystectomy (10/11/2013)  Laparoscopy, surgical; cholecystectomy. (10/11/2013)  esophageal dilation (09/20/2013)  Acromioplasty or acromionectomy, partial, with or without coracoacromial ligament release. (05/31/2012)  Other repair of shoulder (05/31/2012)  Rotator cuff (05/31/2012)  removal of bone spur right heel and rattachment of achilles tendon (2011)  right arm nerve decompression (2011)  back surgery (2010)  sinus surgery (2010)  CTR with removal of bone spur (2007)  right shoulder repair (2006)  Hysterectomy (1998)  repair meniscus tear (1998)  Tonsillectomy (1969)  History of partial nephrectomy  Laparoscopy, surgical; cholecystectomy   Medications  acetaminophen-oxycodone 325 mg-5 mg oral tablet, 1 tab(s), Oral, q4hr,? ?Not Taking, Completed Rx  Afluria PF Quadrivalent [id number] intramuscular suspension, 0.5 mL, IM, As Directed  benzonatate 200 mg oral capsule, See Instructions  betamethasone, 12 mg, Intra-Articular, Once  Celebrate Vitamin D3 Quick-Melt 5000 intl units oral tablet, disintegrating, 5000 IntUnit= 1 tab(s), Oral, Daily,? ?Not taking  Crestor 40 mg oral tablet, 40 mg= 1 tab(s), Oral, Daily  diclofenac sodium 75 mg oral delayed release tablet, 75 mg= 1 tab(s), Oral, BID  dilTIAZem 30 mg oral tablet, 30 mg= 1 tab(s), Oral, QID, 6 refills,? ?Still taking, not as prescribed: takes BID  DULoxetine 30 mg oral delayed release capsule, 30 mg= 1 cap(s), Oral, Daily  fluticasone 50 mcg/inh nasal spray, 2 spray(s), Nasal, Daily,? ?Not taking  hydrochlorothiazide 50 mg oral tablet, 50 mg= 1 tab(s), Oral, Daily  lidocaine 2% preservative-free injectable solution, 2 mL, Intra-Articular, Once  methocarbamol 500 mg oral tablet, 500 mg= 1  tab(s), Oral, TID,? ?Not Taking, Completed Rx  montelukast 10 mg oral TABLET, See Instructions  One-A-Day Women 50 Plus oral tablet, 1 tab(s), Oral, Daily  oxybutynin 5 mg oral tablet, 5 mg= 1 tab(s), Oral, BID, PRN,? ?Not taking  Pantoprazole 40 mg ORAL EC-Tablet, 40 mg= 1 tab(s), Oral, BID, 3 refills  potassium chloride 20 mEq/15 mL oral liquid, 20 mEq= 15 mL, Oral, BID,? ?Still taking, not as prescribed: takes 20mL BID  Premarin 0.625 mg oral tablet, See Instructions  spironolactone 25 mg oral tablet, 25 mg= 1 tab(s), Oral, BID  Toradol 10 mg oral tablet, 10 mg= 1 tab(s), Oral, q6hr,? ?Not Taking, Completed Rx  traMADol 50 mg oral tablet, 50 mg= 1 tab(s), Oral, BID,? 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 Living situation: Home/Independent., 09/26/2019  Nutrition/Health  Regular, 09/26/2019  Spiritual/Cultural  Voodoo, Yes, 08/26/2020  Substance Use - Denies Substance Abuse, 04/06/2015  Never, 09/26/2019  Tobacco - Denies Tobacco Use, 05/29/2012  Never (less than 100 in lifetime), N/A, 12/22/2020  Family History  Asthma.: Brother.  Congestive heart disease.: Mother and Father.  Diabetes: Father.  Diabetes mellitus type 2: Mother.  Dialysis: Negative: Brother.  Glaucoma.: Mother.  Heart disease: Father.  Ileostomy - stoma: Brother.  Multiple sclerosis: Sister.  Prostate cancer.: Brother.  Renal failure syndrome.: Mother.  Stroke: Father.  Immunizations  Vaccine Date Status   zoster vaccine, inactivated 01/17/2020 Recorded   tetanus/diphtheria/pertussis, acel(Tdap) 11/10/2019 Recorded   influenza  virus vaccine, inactivated 09/30/2019 Recorded   influenza virus vaccine, inactivated 09/23/2017 Recorded   influenza virus vaccine, inactivated 09/25/2015 Recorded   influenza virus vaccine, inactivated 10/16/2013 Recorded   Health Maintenance  Health Maintenance  ???Pending?(in the next year)  ??? ??OverDue  ??? ? ? ?Influenza Vaccine due??10/01/20??and every 1??day(s)  ??? ??Due?  ??? ? ? ?Aspirin Therapy for CVD Prevention due??12/22/20??and every 1??year(s)  ??? ? ? ?Cervical Cancer Screening due??12/22/20??Unknown Frequency  ??? ? ? ?Colorectal Screening due??12/22/20??Unknown Frequency  ??? ? ? ?Zoster Vaccine due??12/22/20??Unknown Frequency  ??? ??Due In Future?  ??? ? ? ?Obesity Screening not due until??01/01/21??and every 1??year(s)  ??? ? ? ?Alcohol Misuse Screening not due until??01/02/21??and every 1??year(s)  ???Satisfied?(in the past 1 year)  ??? ??Satisfied?  ??? ? ? ?ADL Screening on??12/22/20.??Satisfied by Sunil Meek  ??? ? ? ?Alcohol Misuse Screening on??09/09/20.??Satisfied by Danni Webber LPN  ??? ? ? ?Blood Pressure Screening on??12/22/20.??Satisfied by Sunil Meek  ??? ? ? ?Body Mass Index Check on??12/22/20.??Satisfied by Sunil Meek  ??? ? ? ?Depression Screening on??12/22/20.??Satisfied by Sunil Meek  ??? ? ? ?Diabetes Screening on??08/29/20.??Satisfied by Cori Santos MT  ??? ? ? ?Influenza Vaccine on??10/08/20.  ??? ? ? ?Obesity Screening on??12/22/20.??Satisfied by Sunil Meek  ??? ? ? ?Zoster Vaccine on??01/17/20.??Satisfied by Priyanka Dover LPN  ?  Diagnostic Results  2 view x-rays of the right shoulder demonstrate?a solidly healed proximal humerus fracture with no change in alignment compared to previous films; hardware intact with no failure or loosening      Patient evaluated and discussed with Jami Carrera NP. I agree with her assessment and plan of care with any exceptions or additions noted.

## 2022-05-02 NOTE — HISTORICAL [facility]
This is a historical note converted from Sajan. Formatting and pictures may have been removed.  Please reference Sajan for original formatting and attached multimedia. Chief Complaint  11 week f/u orif right prox humerus fx, improved ROM, in therapy  History of Present Illness  ?  Here today for follow-up evaluation status post open reduction internal fixation right proximal humerus fracture.?She has been working with physical therapy on range of motion exercises.?She has pain?with?forward elevation. She has prior history of impingement?with?subacromial bursitis.?She states that her range of motion is improving.?Her pain is improved tremendously as well.  ?  Review of Systems  ?  Otherwise negative  ?  Physical Exam  Vitals & Measurements  T:?36.9? ?C (Oral)? HR:?76(Peripheral)? RR:?20? BP:?110/70?  HT:?153.00?cm? WT:?65.000?kg? BMI:?27.77?  ?  Right upper extremity:?Surgical incision is well-healed.?No crepitus with range of motion of the shoulder.?Active forward elevation to 90?, passive?130?. No pain with internal/external rotation.?5 out of 5 motor strength distally. Full range of motion of the elbow?wrist and digits.  ?  Assessment/Plan  Displaced fracture of proximal end of right humerus?S42.291D  Ordered:  Clinic Follow up, *Est. 01/19/21 3:00:00 CST, Order for future visit, Displaced fracture of proximal end of right humerus, Orthopaedics  Post-Op follow-up visit [id number] PC, Displaced fracture of proximal end of right humerus, [facility] Clinic, 11/19/20 10:07:00 CST  XR Shoulder Right Minimum 2 Views, Routine, *Est. 01/19/21 3:00:00 CST, Trauma, None, Ambulatory, Patient Has IV?, Rad Type, Order for future visit, Displaced fracture of proximal end of right humerus, Not Scheduled, *Est. 01/19/21 3:00:00 CST  ?  ?  She is?progressing well. However continue to work with physical therapy on range of motion strengthening exercises.?She may have some impingement?with forward elevation will need to continue  to monitor this?over time. Fractures are healing well. Ill have her follow-up in 2 months?for repeat x-rays of the right shoulder and monitor her clinical progression. She is happy with this plan of care and all questions and concerns were addressed.  ?  Referrals  Clinic Follow up, *Est. 01/19/21 3:00:00 CST, Order for future visit, Displaced fracture of proximal end of right humerus, [facility]   Problem List/Past Medical History  Ongoing  DDD (degenerative disc disease), cervical  Displaced fracture of proximal end of right humerus  Diverticulitis  Dysphagia  Elevated cholesterol  Esophageal dysmotility  GERD - Gastro-esophageal reflux disease  h/o esopageal dilation  Osteoporosis  Sleep apnea  Thyroid nodule  Historical  blood clot 30 y ago  CA - Cancer of kidney  Displaced fracture of proximal end of left humerus  Hiatal hernia  Obesity  Procedure/Surgical History  Injection(s), anesthetic agent(s) and/or steroid; brachial plexus (08/28/2020)  Introduction of Anesthetic Agent into Peripheral Nerves and Plexi, Percutaneous Approach (08/28/2020)  Open treatment of proximal humeral (surgical or anatomical neck) fracture, includes internal fixation, when performed, includes repair of tuberosity(s), when performed; (08/28/2020)  ORIF Humerus Proximal (.) (08/28/2020)  Reposition Right Humeral Head with Internal Fixation Device, Open Approach (08/28/2020)  Biopsy, breast, with placement of breast localization device(s) (eg, clip, metallic pellet), when performed, and imaging of the biopsy specimen, when performed, percutaneous; first lesion, including stereotactic guidance (10/31/2019)  Excision of Chest Skin, External Approach, Diagnostic (10/31/2019)  Esophagogastroduodenoscopy (02/07/2018)  Esophagogastroduodenoscopy, flexible, transoral; with directed submucosal injection(s), any substance (02/07/2018)  Introduction of Other Therapeutic Substance into Upper GI, Via Natural or Artificial Opening Endoscopic  (02/07/2018)  Sclerotherapy (02/07/2018)  Carpal Tunnel Release (Left) (02/04/2016)  Neuroplasty and/or transposition; median nerve at carpal tunnel (02/04/2016)  Release Median Nerve, Open Approach (02/04/2016)  Esophageal manometry (04/06/2015)  Esophageal Motility (04/06/2015)  Esophageal motility (manometric study of the esophagus and/or gastroesophageal junction) study with interpretation and report; with high resolution esophageal pressure topography (04/06/2015)  Cholecystectomy Laparoscopic (None) (10/11/2013)  Laparoscopic cholecystectomy (10/11/2013)  Laparoscopy, surgical; cholecystectomy. (10/11/2013)  esophageal dilation (09/20/2013)  Acromioplasty or acromionectomy, partial, with or without coracoacromial ligament release. (05/31/2012)  Other repair of shoulder (05/31/2012)  Rotator cuff (05/31/2012)  removal of bone spur right heel and rattachment of achilles tendon (2011)  right arm nerve decompression (2011)  back surgery (2010)  sinus surgery (2010)  CTR with removal of bone spur (2007)  right shoulder repair (2006)  Hysterectomy (1998)  repair meniscus tear (1998)  Tonsillectomy (1969)  History of partial nephrectomy  Laparoscopy, surgical; cholecystectomy   Medications  acetaminophen-oxycodone 325 mg-5 mg oral tablet, 1 tab(s), Oral, q4hr,? ?Not Taking, Completed Rx  Afluria PF Quadrivalent [id number] intramuscular suspension, 0.5 mL, IM, As Directed  benzonatate 200 mg oral capsule, See Instructions, 11 refills  Celebrate Vitamin D3 Quick-Melt 5000 intl units oral tablet, disintegrating, 5000 IntUnit= 1 tab(s), Oral, Daily,? ?Not taking  Crestor 40 mg oral tablet, 40 mg= 1 tab(s), Oral, Daily  diclofenac sodium 75 mg oral delayed release tablet, 75 mg= 1 tab(s), Oral, BID  dilTIAZem 30 mg oral tablet, 30 mg= 1 tab(s), Oral, QID, 6 refills,? ?Still taking, not as prescribed: takes BID  DULoxetine 30 mg oral delayed release capsule, 30 mg= 1 cap(s), Oral, Daily  fluticasone 50 mcg/inh nasal spray,  2 spray(s), Nasal, Daily,? ?Not taking  hydrochlorothiazide 50 mg oral tablet, 50 mg= 1 tab(s), Oral, Daily  methocarbamol 500 mg oral tablet, 500 mg= 1 tab(s), Oral, TID,? ?Not Taking, Completed Rx  methocarbamol 500 mg oral tablet, 500 mg= 1 tab(s), Oral, QID,? ?Not Taking, Completed Rx  montelukast 10 mg oral TABLET, See Instructions  One-A-Day Women 50 Plus oral tablet, 1 tab(s), Oral, Daily  oxybutynin 5 mg oral tablet, 5 mg= 1 tab(s), Oral, BID, PRN,? ?Not taking  Pantoprazole 40 mg ORAL EC-Tablet, 40 mg= 1 tab(s), Oral, BID, 3 refills  potassium chloride 20 mEq/15 mL oral liquid, 20 mEq= 15 mL, Oral, BID,? ?Still taking, not as prescribed: takes 20mL BID  Premarin 0.625 mg oral tablet, See Instructions  spironolactone 25 mg oral tablet, 25 mg= 1 tab(s), Oral, BID  Toradol 10 mg oral tablet, 10 mg= 1 tab(s), Oral, q6hr,? ?Not Taking, Completed Rx  traMADol 50 mg oral tablet, 50 mg= 1 tab(s), Oral, BID,? ?Not Taking, Completed Rx  traMADol 50 mg oral tablet, 50 mg= 1 tab(s), Oral, q12hr, PRN,? 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 Living situation: Home/Independent., 09/26/2019  Nutrition/Health  Regular, 09/26/2019  Spiritual/Cultural  Episcopalian, Yes, 08/26/2020  Substance Use - Denies Substance Abuse, 04/06/2015  Never, 09/26/2019  Tobacco - Denies Tobacco Use, 05/29/2012  Never (less than 100 in lifetime), N/A, 11/19/2020  Family History  Asthma.: Brother.  Congestive heart disease.: Mother and Father.  Diabetes: Father.  Diabetes mellitus type 2: Mother.  Dialysis: Negative:  Brother.  Glaucoma.: Mother.  Heart disease: Father.  Ileostomy - stoma: Brother.  Multiple sclerosis: Sister.  Prostate cancer.: Brother.  Renal failure syndrome.: Mother.  Stroke: Father.  Immunizations  Vaccine Date Status   zoster vaccine, inactivated 01/17/2020 Recorded   tetanus/diphtheria/pertussis, acel(Tdap) 11/10/2019 Recorded   influenza virus vaccine, inactivated 09/30/2019 Recorded   influenza virus vaccine, inactivated 09/23/2017 Recorded   influenza virus vaccine, inactivated 09/25/2015 Recorded   influenza virus vaccine, inactivated 10/16/2013 Recorded   Health Maintenance  Health Maintenance  ???Pending?(in the next year)  ??? ??Due?  ??? ? ? ?Influenza Vaccine due??10/01/20??and every 1??day(s)  ??? ? ? ?ADL Screening due??11/19/20??and every 1??year(s)  ??? ? ? ?Aspirin Therapy for CVD Prevention due??11/19/20??and every 1??year(s)  ??? ? ? ?Cervical Cancer Screening due??11/19/20??Unknown Frequency  ??? ? ? ?Colorectal Screening due??11/19/20??Unknown Frequency  ??? ? ? ?Zoster Vaccine due??11/19/20??Unknown Frequency  ??? ??Due In Future?  ??? ? ? ?Obesity Screening not due until??01/01/21??and every 1??year(s)  ??? ? ? ?Alcohol Misuse Screening not due until??01/02/21??and every 1??year(s)  ??? ? ? ?Blood Pressure Screening not due until??10/08/21??and every 1??year(s)  ??? ? ? ?Body Mass Index Check not due until??10/08/21??and every 1??year(s)  ??? ? ? ?Depression Screening not due until??10/08/21??and every 1??year(s)  ???Satisfied?(in the past 1 year)  ??? ??Satisfied?  ??? ? ? ?Alcohol Misuse Screening on??09/09/20.??Satisfied by Veronie LPN, Danni Dayna  ??? ? ? ?Blood Pressure Screening on??11/19/20.??Satisfied by Angela Granados.  ??? ? ? ?Body Mass Index Check on??11/19/20.??Satisfied by Angela Granados.  ??? ? ? ?Depression Screening on??11/19/20.??Satisfied by Angela Granados.  ??? ? ? ?Diabetes Screening on??08/29/20.??Satisfied by Cori Santos MT  ??? ? ? ?Influenza  Vaccine on??10/08/20.  ??? ? ? ?Obesity Screening on??11/19/20.??Satisfied by Angela Granados  ??? ? ? ?Zoster Vaccine on??01/17/20.??Satisfied by Priyanka Dover LPN.  ?  Diagnostic Results  ?  Right shoulder 3 views: Hardware intact. Alignment maintained. Fracture well-healed.  ?

## 2022-05-02 NOTE — HISTORICAL [facility]
This is a historical note converted from Sajan. Formatting and pictures may have been removed.  Please reference Sajan for original formatting and attached multimedia. Chief Complaint  6 week f/u orif right prox humerus fx, no complaints, in therapy.  History of Present Illness  Patient is here today for follow-up evaluation?6 weeks status post open reduction internal fixation right proximal humerus fracture. ?She states she is doing very well today. ?She denies any pain. ?She has not had any incisional drainage or issues.? She states she has been working with physical therapy and feels like her range of motion is improving.? She has been compliant with her?nonweightbearing status to the right upper extremity. ?She is very happy with her progress?today and she does not report any other complaints.  Review of Systems  Otherwise negative  Physical Exam  Vitals & Measurements  T:?36.9? ?C (Oral)? HR:?75(Peripheral)? RR:?20? BP:?102/60?  HT:?153.00?cm? WT:?65.000?kg? BMI:?27.77?  General: Awake, alert, oriented. Patient in no acute distress. Well nourished and well perfused.  Musculoskeletal:?  Right upper extremity:  Surgical incision well-healed with no signs of infection  Passive shoulder range of motion to 160 degrees of forward flexion and 100?degrees of?abduction  Full active range of motion of the elbow, wrist, digits  No swelling to the upper extremity  AIN, PIN, ulnar nerve motor intact  Motor intact to digits  Sensation to light touch intact distally  Brisk capillary refill distally  2+ radial pulse  Assessment/Plan  1.?Displaced fracture of proximal end of right humerus?S42.291D  Ordered:  Clinic Follow up, *Est. 11/19/20 3:00:00 CST, Order for future visit, Displaced fracture of proximal end of right humerus, Orthopaedics  Post-Op follow-up visit [id number] PC, Displaced fracture of proximal end of right humerus, Orthopaedics Clinic, 10/08/20 8:34:00 CDT  PT/OT External Referral, 10/08/20 8:34:00 CDT,  Displaced fracture of proximal end of right humerus, Evaluate and Treat, 3 X Week, Patient has IV, Standard Precautions, ***General PT Orders**  XR Shoulder Right Minimum 2 Views, Routine, *Est. 11/19/20 3:00:00 CST, None, Ambulatory, Patient Has IV?, Rad Type, Order for future visit, Displaced fracture of proximal end of right humerus, Not Scheduled, *Est. 11/19/20 3:00:00 CST  ?  Patient is doing very well today 6 weeks out from open reduction internal fixation right proximal humerus fracture.? Progressed to full passive and active assist range of motion of?the right shoulder.? Advance to 5 pound weightbearing/lifting restriction to the right upper extremity. ?Continue full range of motion of the elbow, wrist, digits. ?Updated physical therapy orders were provided today.? We will have her see us back in 6 weeks for repeat x-rays and evaluation.? She should be ready to?advance to weightbearing as tolerated and full active range of motion at that time.? All questions and concerns were addressed today. ?The patient is very happy with her progress and she understands and agrees with the plan of care.  ?  The above findings, diagnostics, and treatment plan were discussed with Dr. Quintana who is in agreement with the plan of care.?  Referrals  Clinic Follow up, *Est. 11/19/20 3:00:00 CST, Order for future visit, Displaced fracture of proximal end of right humerus, [facility]  PT/OT External Referral, 10/08/20 8:34:00 CDT, Displaced fracture of proximal end of right humerus, Evaluate and Treat, 3 X Week, Patient has IV, Standard Precautions, ***General PT Orders**   Problem List/Past Medical History  Ongoing  DDD (degenerative disc disease), cervical  Displaced fracture of proximal end of right humerus  Diverticulitis  Dysphagia  Elevated cholesterol  Esophageal dysmotility  GERD - Gastro-esophageal reflux disease  h/o esopageal dilation  Osteoporosis  Sleep apnea  Thyroid nodule  Historical  blood clot 30 y ago  CA -  Cancer of kidney  Displaced fracture of proximal end of left humerus  Hiatal hernia  Obesity  Procedure/Surgical History  Injection(s), anesthetic agent(s) and/or steroid; brachial plexus (08/28/2020)  Introduction of Anesthetic Agent into Peripheral Nerves and Plexi, Percutaneous Approach (08/28/2020)  Open treatment of proximal humeral (surgical or anatomical neck) fracture, includes internal fixation, when performed, includes repair of tuberosity(s), when performed; (08/28/2020)  ORIF Humerus Proximal (.) (08/28/2020)  Reposition Right Humeral Head with Internal Fixation Device, Open Approach (08/28/2020)  Biopsy, breast, with placement of breast localization device(s) (eg, clip, metallic pellet), when performed, and imaging of the biopsy specimen, when performed, percutaneous; first lesion, including stereotactic guidance (10/31/2019)  Excision of Chest Skin, External Approach, Diagnostic (10/31/2019)  Esophagogastroduodenoscopy (02/07/2018)  Esophagogastroduodenoscopy, flexible, transoral; with directed submucosal injection(s), any substance (02/07/2018)  Introduction of Other Therapeutic Substance into Upper GI, Via Natural or Artificial Opening Endoscopic (02/07/2018)  Sclerotherapy (02/07/2018)  Carpal Tunnel Release (Left) (02/04/2016)  Neuroplasty and/or transposition; median nerve at carpal tunnel (02/04/2016)  Release Median Nerve, Open Approach (02/04/2016)  Esophageal manometry (04/06/2015)  Esophageal Motility (04/06/2015)  Esophageal motility (manometric study of the esophagus and/or gastroesophageal junction) study with interpretation and report; with high resolution esophageal pressure topography (04/06/2015)  Cholecystectomy Laparoscopic (None) (10/11/2013)  Laparoscopic cholecystectomy (10/11/2013)  Laparoscopy, surgical; cholecystectomy. (10/11/2013)  esophageal dilation (09/20/2013)  Acromioplasty or acromionectomy, partial, with or without coracoacromial ligament release. (05/31/2012)  Other  repair of shoulder (05/31/2012)  Rotator cuff (05/31/2012)  removal of bone spur right heel and rattachment of achilles tendon (2011)  right arm nerve decompression (2011)  back surgery (2010)  sinus surgery (2010)  CTR with removal of bone spur (2007)  right shoulder repair (2006)  Hysterectomy (1998)  repair meniscus tear (1998)  Tonsillectomy (1969)  History of partial nephrectomy  Laparoscopy, surgical; cholecystectomy   Medications  acetaminophen-oxycodone 325 mg-5 mg oral tablet, 1 tab(s), Oral, q4hr  Afluria PF Quadrivalent [id number] intramuscular suspension, 0.5 mL, IM, As Directed  benzonatate 200 mg oral capsule, See Instructions, 11 refills  Celebrate Vitamin D3 Quick-Melt 5000 intl units oral tablet, disintegrating, 5000 IntUnit= 1 tab(s), Oral, Daily,? ?Not taking  Crestor 40 mg oral tablet, 40 mg= 1 tab(s), Oral, Daily  diclofenac sodium 75 mg oral delayed release tablet, 75 mg= 1 tab(s), Oral, BID  dilTIAZem 30 mg oral tablet, 30 mg= 1 tab(s), Oral, QID, 6 refills,? ?Still taking, not as prescribed: takes BID  DULoxetine 30 mg oral delayed release capsule, 30 mg= 1 cap(s), Oral, Daily  fluticasone 50 mcg/inh nasal spray, 2 spray(s), Nasal, Daily,? ?Not taking  hydrochlorothiazide 50 mg oral tablet, 50 mg= 1 tab(s), Oral, Daily  methocarbamol 500 mg oral tablet, 500 mg= 1 tab(s), Oral, TID  montelukast 10 mg oral TABLET, See Instructions  One-A-Day Women 50 Plus oral tablet, 1 tab(s), Oral, Daily  oxybutynin 5 mg oral tablet, 5 mg= 1 tab(s), Oral, BID, PRN,? ?Not taking  Pantoprazole 40 mg ORAL EC-Tablet, 40 mg= 1 tab(s), Oral, BID, 3 refills  potassium chloride 20 mEq/15 mL oral liquid, 20 mEq= 15 mL, Oral, BID,? 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 Living situation: Home/Independent., 09/26/2019  Nutrition/Health  Regular, 09/26/2019  Spiritual/Cultural  Quaker, Yes, 08/26/2020  Substance Use - Denies Substance Abuse, 04/06/2015  Never, 09/26/2019  Tobacco - Denies Tobacco Use, 05/29/2012  Never (less than 100 in lifetime), N/A, 10/08/2020  Family History  Asthma.: Brother.  Congestive heart disease.: Mother and Father.  Diabetes: Father.  Diabetes mellitus type 2: Mother.  Dialysis: Negative: Brother.  Glaucoma.: Mother.  Heart disease: Father.  Ileostomy - stoma: Brother.  Multiple sclerosis: Sister.  Prostate cancer.: Brother.  Renal failure syndrome.: Mother.  Stroke: Father.  Immunizations  Vaccine Date Status   zoster vaccine, inactivated 01/17/2020 Recorded   tetanus/diphtheria/pertussis, acel(Tdap) 11/10/2019 Recorded   influenza virus vaccine, inactivated 09/30/2019 Recorded   influenza virus vaccine, inactivated 09/23/2017 Recorded   influenza virus vaccine, inactivated 09/25/2015 Recorded   influenza virus vaccine, inactivated 10/16/2013 Recorded   Health Maintenance  Health Maintenance  ???Pending?(in the next year)  ??? ??OverDue  ??? ? ? ?Influenza Vaccine due??10/01/19??and every 1??day(s)  ??? ??Due?  ??? ? ? ?ADL Screening due??10/08/20??and every 1??year(s)  ??? ? ? ?Aspirin Therapy for CVD Prevention due??10/08/20??and every 1??year(s)  ??? ? ? ?Cervical Cancer Screening due??10/08/20??and every?  ??? ? ? ?Colorectal Screening due??10/08/20??and every?  ??? ? ? ?Zoster Vaccine due??10/08/20??and every?  ??? ??Due In Future?  ??? ? ? ?Obesity Screening not due  until??01/01/21??and every 1??year(s)  ??? ? ? ?Alcohol Misuse Screening not due until??01/02/21??and every 1??year(s)  ???Satisfied?(in the past 1 year)  ??? ??Satisfied?  ??? ? ? ?Alcohol Misuse Screening on??09/09/20.??Satisfied by Danni Webber LPN  ??? ? ? ?Blood Pressure Screening on??10/08/20.??Satisfied by Angela Granados  ??? ? ? ?Body Mass Index Check on??10/08/20.??Satisfied by Angela Granados  ??? ? ? ?Breast Cancer Screening on??10/24/19.??Satisfied by Hubert Ring  ??? ? ? ?Depression Screening on??10/08/20.??Satisfied by Angela Granados  ??? ? ? ?Diabetes Screening on??08/29/20.??Satisfied by Cori Santos MT  ??? ? ? ?Influenza Vaccine on??10/08/20.  ??? ? ? ?Obesity Screening on??10/08/20.??Satisfied by Angela Granados  ??? ? ? ?Tetanus Vaccine on??11/10/19.??Satisfied by Priyanka Dover LPN.  ??? ? ? ?Zoster Vaccine on??01/17/20.??Satisfied by Priyanka Dover LPN.  ?  Diagnostic Results  2 view x-rays of the right shoulder demonstrate stable alignment with no fracture displacement compared to previous films; hardware intact with no failure or loosening;?interval bone healing noted      Patient evaluated and discussed with Jami Carrera NP. I agree with her assessment and plan of care with any exceptions or additions noted.

## 2022-05-06 ENCOUNTER — HOSPITAL ENCOUNTER (OUTPATIENT)
Dept: RADIOLOGY | Facility: HOSPITAL | Age: 67
Discharge: HOME | End: 2022-05-06
Attending: INTERNAL MEDICINE
Payer: COMMERCIAL

## 2022-05-06 DIAGNOSIS — Z12.31 BREAST CANCER SCREENING BY MAMMOGRAM: ICD-10-CM

## 2022-05-06 DIAGNOSIS — Z13.820 OSTEOPOROSIS SCREENING: ICD-10-CM

## 2022-05-06 PROCEDURE — 77067 MAMMO DIGITAL SCREENING BILAT WITH TOMO: ICD-10-PCS | Mod: 26,,, | Performed by: RADIOLOGY

## 2022-05-06 PROCEDURE — 77063 BREAST TOMOSYNTHESIS BI: CPT | Mod: 26,,, | Performed by: RADIOLOGY

## 2022-05-06 PROCEDURE — 77080 DXA BONE DENSITY AXIAL: CPT | Mod: TC

## 2022-05-06 PROCEDURE — 77067 SCR MAMMO BI INCL CAD: CPT | Mod: TC

## 2022-05-06 PROCEDURE — 77067 SCR MAMMO BI INCL CAD: CPT | Mod: 26,,, | Performed by: RADIOLOGY

## 2022-05-06 PROCEDURE — 77080 DEXA BONE DENSITY SPINE HIP: ICD-10-PCS | Mod: 26,,, | Performed by: STUDENT IN AN ORGANIZED HEALTH CARE EDUCATION/TRAINING PROGRAM

## 2022-05-06 PROCEDURE — 77080 DXA BONE DENSITY AXIAL: CPT | Mod: 26,,, | Performed by: STUDENT IN AN ORGANIZED HEALTH CARE EDUCATION/TRAINING PROGRAM

## 2022-05-06 PROCEDURE — 77063 MAMMO DIGITAL SCREENING BILAT WITH TOMO: ICD-10-PCS | Mod: 26,,, | Performed by: RADIOLOGY

## 2022-05-17 PROBLEM — M50.30 DEGENERATION OF INTERVERTEBRAL DISC OF CERVICAL REGION: Status: ACTIVE | Noted: 2022-05-17

## 2022-05-17 PROBLEM — Z86.0101 HISTORY OF ADENOMATOUS POLYP OF COLON: Status: ACTIVE | Noted: 2022-05-17

## 2022-05-17 PROBLEM — E04.1 THYROID NODULE: Status: ACTIVE | Noted: 2022-05-17

## 2022-05-17 PROBLEM — G47.30 SLEEP APNEA: Status: ACTIVE | Noted: 2022-05-17

## 2022-05-17 PROBLEM — M19.90 ARTHRITIS: Status: ACTIVE | Noted: 2022-05-17

## 2022-05-17 PROBLEM — C64.9 MALIGNANT NEOPLASM OF KIDNEY: Status: ACTIVE | Noted: 2022-05-17

## 2022-05-17 PROBLEM — K21.9 GASTROESOPHAGEAL REFLUX DISEASE: Status: ACTIVE | Noted: 2022-05-17

## 2022-05-17 PROBLEM — Z86.010 HISTORY OF ADENOMATOUS POLYP OF COLON: Status: ACTIVE | Noted: 2022-05-17

## 2022-05-17 PROBLEM — K22.4 ESOPHAGEAL DYSMOTILITY: Status: ACTIVE | Noted: 2017-12-21

## 2022-05-17 PROBLEM — E05.90 SUBCLINICAL HYPERTHYROIDISM: Status: ACTIVE | Noted: 2022-05-17

## 2022-05-17 PROBLEM — K22.89 DILATATION OF ESOPHAGUS: Status: ACTIVE | Noted: 2022-05-17

## 2022-05-17 PROBLEM — K22.0 ACHALASIA OF DIGESTIVE TRACT: Status: ACTIVE | Noted: 2022-05-17

## 2022-05-17 PROBLEM — E78.5 OTHER AND UNSPECIFIED HYPERLIPIDEMIA: Status: ACTIVE | Noted: 2022-05-17

## 2022-05-26 ENCOUNTER — TELEPHONE (OUTPATIENT)
Dept: INTERNAL MEDICINE | Facility: CLINIC | Age: 67
End: 2022-05-26
Payer: COMMERCIAL

## 2022-06-08 RX ORDER — DICLOFENAC SODIUM 75 MG/1
75 TABLET, DELAYED RELEASE ORAL 2 TIMES DAILY
COMMUNITY
Start: 2022-02-08 | End: 2022-06-08 | Stop reason: SDUPTHER

## 2022-06-08 RX ORDER — DICLOFENAC SODIUM 75 MG/1
75 TABLET, DELAYED RELEASE ORAL 2 TIMES DAILY
Qty: 30 TABLET | Refills: 0 | Status: SHIPPED | OUTPATIENT
Start: 2022-06-08 | End: 2022-08-23

## 2022-06-13 RX ORDER — ESTRADIOL 1 MG/1
TABLET ORAL
Qty: 90 TABLET | Refills: 3 | Status: SHIPPED | OUTPATIENT
Start: 2022-06-13 | End: 2022-11-02 | Stop reason: SDUPTHER

## 2022-08-18 ENCOUNTER — TELEPHONE (OUTPATIENT)
Dept: INTERNAL MEDICINE | Facility: CLINIC | Age: 67
End: 2022-08-18
Payer: COMMERCIAL

## 2022-08-18 DIAGNOSIS — Z11.4 SCREENING FOR HIV (HUMAN IMMUNODEFICIENCY VIRUS): ICD-10-CM

## 2022-08-18 DIAGNOSIS — Z11.59 NEED FOR HEPATITIS C SCREENING TEST: ICD-10-CM

## 2022-08-18 DIAGNOSIS — E05.90 HYPERTHYROIDISM: Primary | ICD-10-CM

## 2022-08-18 DIAGNOSIS — I10 HYPERTENSION, UNSPECIFIED TYPE: ICD-10-CM

## 2022-08-18 NOTE — TELEPHONE ENCOUNTER
----- Message from Emili Gonzales Patient Care Assistant sent at 8/18/2022  2:48 PM CDT -----  Regarding: RE: MATTHEW Christie 8/25/22 @0940  Pt. Confirmed appointment and fasting labs.  ----- Message -----  From: Hanh Leggett LPN  Sent: 8/18/2022   2:45 PM CDT  To: Emili Goznales Patient Care Assistant  Subject: MATTHEW Christie 8/25/22 @0940                        Are there any outstanding tasks in the chart? Pt will need fasting labs    Is there any documentation of tasks? no    Has patient seen another physician, been to the ER, C, or admitted to hospital since last visit?    Has the patient done blood work or imaging since last visit?

## 2022-08-24 ENCOUNTER — DOCUMENTATION ONLY (OUTPATIENT)
Dept: INTERNAL MEDICINE | Facility: CLINIC | Age: 67
End: 2022-08-24
Payer: COMMERCIAL

## 2022-08-25 ENCOUNTER — OFFICE VISIT (OUTPATIENT)
Dept: INTERNAL MEDICINE | Facility: CLINIC | Age: 67
End: 2022-08-25
Payer: COMMERCIAL

## 2022-08-25 VITALS
BODY MASS INDEX: 29.64 KG/M2 | OXYGEN SATURATION: 99 % | SYSTOLIC BLOOD PRESSURE: 102 MMHG | WEIGHT: 151 LBS | DIASTOLIC BLOOD PRESSURE: 64 MMHG | HEIGHT: 60 IN | RESPIRATION RATE: 18 BRPM | HEART RATE: 87 BPM

## 2022-08-25 DIAGNOSIS — K22.0 ACHALASIA OF DIGESTIVE TRACT: ICD-10-CM

## 2022-08-25 DIAGNOSIS — H93.19 TINNITUS, UNSPECIFIED LATERALITY: ICD-10-CM

## 2022-08-25 DIAGNOSIS — R00.2 PALPITATIONS: ICD-10-CM

## 2022-08-25 DIAGNOSIS — E05.90 SUBCLINICAL HYPERTHYROIDISM: Primary | ICD-10-CM

## 2022-08-25 DIAGNOSIS — M25.559 HIP PAIN: ICD-10-CM

## 2022-08-25 DIAGNOSIS — R07.9 CHEST PAIN, UNSPECIFIED TYPE: ICD-10-CM

## 2022-08-25 DIAGNOSIS — Z00.00 WELLNESS EXAMINATION: ICD-10-CM

## 2022-08-25 DIAGNOSIS — I10 PRIMARY HYPERTENSION: ICD-10-CM

## 2022-08-25 PROCEDURE — 99214 PR OFFICE/OUTPT VISIT, EST, LEVL IV, 30-39 MIN: ICD-10-PCS | Mod: 25,,, | Performed by: INTERNAL MEDICINE

## 2022-08-25 PROCEDURE — 1160F PR REVIEW ALL MEDS BY PRESCRIBER/CLIN PHARMACIST DOCUMENTED: ICD-10-PCS | Mod: CPTII,,, | Performed by: INTERNAL MEDICINE

## 2022-08-25 PROCEDURE — 1160F RVW MEDS BY RX/DR IN RCRD: CPT | Mod: CPTII,,, | Performed by: INTERNAL MEDICINE

## 2022-08-25 PROCEDURE — 1159F PR MEDICATION LIST DOCUMENTED IN MEDICAL RECORD: ICD-10-PCS | Mod: CPTII,,, | Performed by: INTERNAL MEDICINE

## 2022-08-25 PROCEDURE — 3074F PR MOST RECENT SYSTOLIC BLOOD PRESSURE < 130 MM HG: ICD-10-PCS | Mod: CPTII,,, | Performed by: INTERNAL MEDICINE

## 2022-08-25 PROCEDURE — 3078F PR MOST RECENT DIASTOLIC BLOOD PRESSURE < 80 MM HG: ICD-10-PCS | Mod: CPTII,,, | Performed by: INTERNAL MEDICINE

## 2022-08-25 PROCEDURE — 3008F BODY MASS INDEX DOCD: CPT | Mod: CPTII,,, | Performed by: INTERNAL MEDICINE

## 2022-08-25 PROCEDURE — 1159F MED LIST DOCD IN RCRD: CPT | Mod: CPTII,,, | Performed by: INTERNAL MEDICINE

## 2022-08-25 PROCEDURE — 99214 OFFICE O/P EST MOD 30 MIN: CPT | Mod: 25,,, | Performed by: INTERNAL MEDICINE

## 2022-08-25 PROCEDURE — 99396 PR PREVENTIVE VISIT,EST,40-64: ICD-10-PCS | Mod: ,,, | Performed by: INTERNAL MEDICINE

## 2022-08-25 PROCEDURE — 3078F DIAST BP <80 MM HG: CPT | Mod: CPTII,,, | Performed by: INTERNAL MEDICINE

## 2022-08-25 PROCEDURE — 3008F PR BODY MASS INDEX (BMI) DOCUMENTED: ICD-10-PCS | Mod: CPTII,,, | Performed by: INTERNAL MEDICINE

## 2022-08-25 PROCEDURE — 99396 PREV VISIT EST AGE 40-64: CPT | Mod: ,,, | Performed by: INTERNAL MEDICINE

## 2022-08-25 PROCEDURE — 3074F SYST BP LT 130 MM HG: CPT | Mod: CPTII,,, | Performed by: INTERNAL MEDICINE

## 2022-08-25 RX ORDER — FENOFIBRATE 67 MG/1
67 CAPSULE ORAL NIGHTLY
COMMUNITY
Start: 2021-08-23

## 2022-08-25 RX ORDER — MULTIVITAMIN
2 TABLET ORAL DAILY
COMMUNITY
Start: 2021-08-23

## 2022-08-25 RX ORDER — SIMVASTATIN 40 MG/1
TABLET, FILM COATED ORAL
COMMUNITY
Start: 2022-01-11

## 2022-08-25 RX ORDER — DULOXETIN HYDROCHLORIDE 60 MG/1
60 CAPSULE, DELAYED RELEASE ORAL DAILY
COMMUNITY
Start: 2022-07-25 | End: 2023-09-12

## 2022-08-25 RX ORDER — PANTOPRAZOLE SODIUM 40 MG/1
40 TABLET, DELAYED RELEASE ORAL DAILY
COMMUNITY
End: 2022-10-26
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
## 2022-08-25 NOTE — ASSESSMENT & PLAN NOTE
EKG today.  Unclear if its an intermittent arrhythmia or related to her low blood pressure.  Stop hctz.  Cont diltiazem.

## 2022-08-25 NOTE — ASSESSMENT & PLAN NOTE
Health Maintenance Due   Topic Date Due    Hepatitis C Screening  Never done    Cervical Cancer Screening  Never done    HIV Screening  Never done    TETANUS VACCINE  Never done    Colorectal Cancer Screening  Never done    COVID-19 Vaccine (3 - Booster for Nhi series) 03/22/2022     I don't have the record or her prior colonoscopy.  Will refer to Katia.

## 2022-09-01 RX ORDER — CELECOXIB 200 MG/1
200 CAPSULE ORAL DAILY
Qty: 30 CAPSULE | Refills: 3 | Status: SHIPPED | OUTPATIENT
Start: 2022-09-01 | End: 2022-11-02 | Stop reason: SDUPTHER

## 2022-09-18 ENCOUNTER — HISTORICAL (OUTPATIENT)
Dept: ADMINISTRATIVE | Facility: HOSPITAL | Age: 67
End: 2022-09-18
Payer: COMMERCIAL

## 2022-09-22 ENCOUNTER — OFFICE VISIT (OUTPATIENT)
Dept: INTERNAL MEDICINE | Facility: CLINIC | Age: 67
End: 2022-09-22
Payer: COMMERCIAL

## 2022-09-22 VITALS
BODY MASS INDEX: 30.04 KG/M2 | HEIGHT: 60 IN | SYSTOLIC BLOOD PRESSURE: 114 MMHG | DIASTOLIC BLOOD PRESSURE: 70 MMHG | RESPIRATION RATE: 20 BRPM | WEIGHT: 153 LBS | OXYGEN SATURATION: 97 % | HEART RATE: 76 BPM

## 2022-09-22 DIAGNOSIS — M19.90 ARTHRITIS: Primary | ICD-10-CM

## 2022-09-22 DIAGNOSIS — R07.9 CHEST PAIN, UNSPECIFIED TYPE: ICD-10-CM

## 2022-09-22 DIAGNOSIS — K22.0 ACHALASIA OF DIGESTIVE TRACT: ICD-10-CM

## 2022-09-22 PROBLEM — R00.2 PALPITATIONS: Status: RESOLVED | Noted: 2022-08-25 | Resolved: 2022-09-22

## 2022-09-22 PROCEDURE — 3008F PR BODY MASS INDEX (BMI) DOCUMENTED: ICD-10-PCS | Mod: CPTII,,, | Performed by: INTERNAL MEDICINE

## 2022-09-22 PROCEDURE — 1159F MED LIST DOCD IN RCRD: CPT | Mod: CPTII,,, | Performed by: INTERNAL MEDICINE

## 2022-09-22 PROCEDURE — 3008F BODY MASS INDEX DOCD: CPT | Mod: CPTII,,, | Performed by: INTERNAL MEDICINE

## 2022-09-22 PROCEDURE — 1160F PR REVIEW ALL MEDS BY PRESCRIBER/CLIN PHARMACIST DOCUMENTED: ICD-10-PCS | Mod: CPTII,,, | Performed by: INTERNAL MEDICINE

## 2022-09-22 PROCEDURE — 1159F PR MEDICATION LIST DOCUMENTED IN MEDICAL RECORD: ICD-10-PCS | Mod: CPTII,,, | Performed by: INTERNAL MEDICINE

## 2022-09-22 PROCEDURE — 3074F PR MOST RECENT SYSTOLIC BLOOD PRESSURE < 130 MM HG: ICD-10-PCS | Mod: CPTII,,, | Performed by: INTERNAL MEDICINE

## 2022-09-22 PROCEDURE — 3074F SYST BP LT 130 MM HG: CPT | Mod: CPTII,,, | Performed by: INTERNAL MEDICINE

## 2022-09-22 PROCEDURE — 99214 OFFICE O/P EST MOD 30 MIN: CPT | Mod: ,,, | Performed by: INTERNAL MEDICINE

## 2022-09-22 PROCEDURE — 99214 PR OFFICE/OUTPT VISIT, EST, LEVL IV, 30-39 MIN: ICD-10-PCS | Mod: ,,, | Performed by: INTERNAL MEDICINE

## 2022-09-22 PROCEDURE — 3078F DIAST BP <80 MM HG: CPT | Mod: CPTII,,, | Performed by: INTERNAL MEDICINE

## 2022-09-22 PROCEDURE — 3078F PR MOST RECENT DIASTOLIC BLOOD PRESSURE < 80 MM HG: ICD-10-PCS | Mod: CPTII,,, | Performed by: INTERNAL MEDICINE

## 2022-09-22 PROCEDURE — 1160F RVW MEDS BY RX/DR IN RCRD: CPT | Mod: CPTII,,, | Performed by: INTERNAL MEDICINE

## 2022-09-22 RX ORDER — AZITHROMYCIN 250 MG/1
TABLET, FILM COATED ORAL
Qty: 6 TABLET | Refills: 0 | Status: SHIPPED | OUTPATIENT
Start: 2022-09-22 | End: 2022-09-27

## 2022-09-22 RX ORDER — EZETIMIBE 10 MG/1
10 TABLET ORAL NIGHTLY
COMMUNITY
Start: 2022-09-08

## 2022-09-22 RX ORDER — BENZONATATE 200 MG/1
CAPSULE ORAL
COMMUNITY
Start: 2022-08-25 | End: 2022-10-25

## 2022-09-22 RX ORDER — DOXYCYCLINE HYCLATE 100 MG
100 TABLET ORAL 2 TIMES DAILY
Qty: 20 TABLET | Refills: 0 | Status: SHIPPED | OUTPATIENT
Start: 2022-09-22 | End: 2022-09-22

## 2022-09-22 NOTE — PROGRESS NOTES
Subjective:      Chief Complaint: Follow-up (4wk/Feeling better, BP has been better /Seen Cardio- he said her problems are probably esophageal related ), Sore Throat (X3wk, covid neg ), and Cough (X3wk- coughing up green mucus, sometimes blood mixed )      HPI:  She is here for f/u light headedness.  The light headedness resolved.  Dr. Larios did a stress test, and everything was fine.  He thinks its her esophagus.  Her appt with Katia is pending.  She stayed off the hctz for a week.  But the ear stared ringing again.  So she is back on the hctz.  After a week back on the hctz, the buzzing in her ear improved.   And she didn't have any more dizziness.    She loves the celebrex for her pain.  She is able to do a lot more than she did.    She is also c/o sore throat and  ear aches for the past 3 weeks.  Its come and gone.  But its back again.  She is using saline spray. No sinus congestion.   When she coughs, its green mucus and occ has blood in it.  No headaches.  No fevers or chills.  She did take a home Covid test twice that was negative.      The palpitations nannette fine.  Ric thinks it was her esophagus.    The diarrhea is resolved for now.  It has a tendency to come and go.    Problem Noted   Tinnitus 8/25/2022   Chest Pain 8/25/2022   Hip Pain 8/25/2022   Arthritis 5/17/2022   Achalasia of Digestive Tract 5/17/2022   Gastroesophageal Reflux Disease 5/17/2022   Dilatation of Esophagus 5/17/2022    STATES DILATED ABOUT EVERY 2 YEARS     Degeneration of Intervertebral Disc of Cervical Region 5/17/2022   History of Adenomatous Polyp of Colon 5/17/2022   Malignant Neoplasm of Kidney 5/17/2022   Other and Unspecified Hyperlipidemia 5/17/2022    SAW DR LARIOS 1/4/2016 CAROTID US DONE AT THAT TIME     Sleep Apnea 5/17/2022    non compliant with CPAP     Subclinical Hyperthyroidism 5/17/2022   Thyroid Nodule 5/17/2022    followed by Dr. Davis     Esophageal Dysmotility 12/21/2017    followed by Dr. Em in  [place], on diltiazem and protonix, also followed by [facility]     Diverticulosis of Colon 11/8/2013   Family History of Colonic Polyps 11/8/2013   Dysphagia, Pharyngoesophageal Phase 9/20/2013   Diaphragmatic Hernia Without Obstruction Or Gangrene 9/20/2013   Palpitations (Resolved) 8/25/2022        The patient's Health Maintenance was reviewed and the following appears to be due:   Health Maintenance Due   Topic Date Due    Hepatitis C Screening  Never done    Cervical Cancer Screening  Never done    HIV Screening  Never done    TETANUS VACCINE  Never done    Colorectal Cancer Screening  Never done    COVID-19 Vaccine (3 - Booster for Nhi series) 03/22/2022    Influenza Vaccine (1) 09/01/2022       Past Medical History:  Past Medical History:   Diagnosis Date    Cancer of left kidney     Diverticulitis     Fibroid tumor     positive for cancer    High cholesterol     Osteoarthritis of both hips     Osteoporosis     S/P ASHOK (total abdominal hysterectomy)     had a fibroid     Review of patient's allergies indicates:   Allergen Reactions    Diphenhydramine hcl Other (See Comments)    Hydrocodone-acetaminophen      Other reaction(s): weird feeling     Current Outpatient Medications on File Prior to Visit   Medication Sig Dispense Refill    benzonatate (TESSALON) 200 MG capsule       calcium-vitamin D 600 mg-10 mcg (400 unit) Tab Take by mouth.      celecoxib (CELEBREX) 200 MG capsule Take 1 capsule (200 mg total) by mouth once daily. 30 capsule 3    diltiaZEM (CARDIZEM) 30 MG tablet TAKE 1 TABLET BY MOUTH EVERY 6 HOURS 120 tablet 1    DULoxetine (CYMBALTA) 60 MG capsule Take 60 mg by mouth once daily.      estradioL (ESTRACE) 1 MG tablet TAKE 1 TABLET BY MOUTH DAILY 90 tablet 3    ezetimibe (ZETIA) 10 mg tablet       fenofibrate micronized (LOFIBRA) 67 MG capsule Take 67 mg by mouth.      hydroCHLOROthiazide (HYDRODIURIL) 50 MG tablet once daily.       montelukast (SINGULAIR) 10 mg tablet TAKE 1 TABLET BY MOUTH  DAILY 90 tablet 3    pantoprazole (PROTONIX) 40 MG tablet Take 40 mg by mouth once daily.      potassium chloride 10% (KAYCIEL) 20 mEq/15 mL oral solution Take by mouth once daily. One tablespoon in the morning and night      simvastatin (ZOCOR) 40 MG tablet   See Instructions, TAKE 1 TABLET BY MOUTH EVERY DAY AT BEDTIME, # 30 tab(s), 11 Refill(s), Pharmacy: [id number], 153, cm, Height/Length Dosing, 08/23/21 11:25:00 CDT, 65.77, kg, Weight Dosing, 08/23/21 11:25:00 CDT       No current facility-administered medications on file prior to visit.       Review of Systems    Objective:   /70 (BP Location: Left arm, Patient Position: Sitting, BP Method: Small (Manual))   Pulse 76   Resp 20   Ht 5' (1.524 m)   Wt 69.4 kg (153 lb)   SpO2 97%   BMI 29.88 kg/m²     Physical Exam  Vitals reviewed.   Constitutional:       General: She is not in acute distress.     Appearance: Normal appearance. She is not ill-appearing or diaphoretic.   HENT:      Head: Normocephalic and atraumatic.   Pulmonary:      Effort: Pulmonary effort is normal.   Skin:     General: Skin is warm and dry.   Neurological:      General: No focal deficit present.      Mental Status: She is alert.   Psychiatric:         Mood and Affect: Mood normal.         Behavior: Behavior normal.         Thought Content: Thought content normal.         Judgment: Judgment normal.     Assessment and Plan:     Arthritis  She loves the celebrex.  I rec she take it on days when the pain is bad and use tylenol on days when the pain is more mild to prevent issues with the renal function.    Chest pain  Her cardiac w/u was negative.    Achalasia of digestive tract  Appt with Katia pending.      Follow up in about 6 months (around 3/22/2023).    Medications Ordered This Encounter   Medications    azithromycin (Z-DILLON) 250 MG tablet     Sig: Take 2 tablets by mouth on day 1; Take 1 tablet by mouth on days 2-5     Dispense:  6 tablet     Refill:  0      This replaces the doxycyline rx sent for same patient.  Cancel the doxy please.     [unfilled]  Orders Placed This Encounter   Procedures    Basic Metabolic Panel     Standing Status:   Future     Standing Expiration Date:   11/21/2023

## 2022-09-22 NOTE — ASSESSMENT & PLAN NOTE
She loves the celebrex.  I rec she take it on days when the pain is bad and use tylenol on days when the pain is more mild to prevent issues with the renal function.

## 2022-10-06 LAB
CRC RECOMMENDATION EXT: NORMAL
CRC RECOMMENDATION EXT: NORMAL

## 2022-10-07 ENCOUNTER — DOCUMENTATION ONLY (OUTPATIENT)
Dept: INTERNAL MEDICINE | Facility: CLINIC | Age: 67
End: 2022-10-07
Payer: COMMERCIAL

## 2022-11-02 ENCOUNTER — TELEPHONE (OUTPATIENT)
Dept: INTERNAL MEDICINE | Facility: CLINIC | Age: 67
End: 2022-11-02
Payer: MEDICARE

## 2022-11-02 DIAGNOSIS — R05.9 COUGH, UNSPECIFIED TYPE: ICD-10-CM

## 2022-11-02 DIAGNOSIS — R13.19 ESOPHAGEAL DYSPHAGIA: ICD-10-CM

## 2022-11-02 DIAGNOSIS — M19.90 ARTHRITIS: Primary | ICD-10-CM

## 2022-11-02 DIAGNOSIS — J30.2 SEASONAL ALLERGIES: Primary | ICD-10-CM

## 2022-11-02 DIAGNOSIS — Z79.890 HORMONE REPLACEMENT THERAPY: ICD-10-CM

## 2022-11-02 DIAGNOSIS — K21.9 GASTROESOPHAGEAL REFLUX DISEASE, UNSPECIFIED WHETHER ESOPHAGITIS PRESENT: ICD-10-CM

## 2022-11-02 DIAGNOSIS — R13.10 ODYNOPHAGIA: ICD-10-CM

## 2022-11-02 DIAGNOSIS — J30.2 SEASONAL ALLERGIES: ICD-10-CM

## 2022-11-02 RX ORDER — DILTIAZEM HYDROCHLORIDE 30 MG/1
30 TABLET, FILM COATED ORAL EVERY 6 HOURS
Qty: 360 TABLET | Refills: 3 | Status: SHIPPED | OUTPATIENT
Start: 2022-11-02 | End: 2022-11-02 | Stop reason: SDUPTHER

## 2022-11-02 RX ORDER — BENZONATATE 200 MG/1
200 CAPSULE ORAL 3 TIMES DAILY PRN
Qty: 90 CAPSULE | Refills: 3 | Status: SHIPPED | OUTPATIENT
Start: 2022-11-02

## 2022-11-02 RX ORDER — MONTELUKAST SODIUM 10 MG/1
10 TABLET ORAL DAILY
Qty: 90 TABLET | Refills: 3 | Status: SHIPPED | OUTPATIENT
Start: 2022-11-02 | End: 2023-12-29

## 2022-11-02 RX ORDER — MONTELUKAST SODIUM 10 MG/1
10 TABLET ORAL DAILY
Qty: 90 TABLET | Refills: 3 | Status: SHIPPED | OUTPATIENT
Start: 2022-11-02 | End: 2022-11-02 | Stop reason: SDUPTHER

## 2022-11-02 RX ORDER — PANTOPRAZOLE SODIUM 40 MG/1
40 TABLET, DELAYED RELEASE ORAL DAILY
Qty: 90 TABLET | Refills: 3 | Status: SHIPPED | OUTPATIENT
Start: 2022-11-02 | End: 2022-11-02 | Stop reason: SDUPTHER

## 2022-11-02 RX ORDER — PANTOPRAZOLE SODIUM 40 MG/1
40 TABLET, DELAYED RELEASE ORAL DAILY
Qty: 90 TABLET | Refills: 3 | Status: SHIPPED | OUTPATIENT
Start: 2022-11-02 | End: 2023-09-12

## 2022-11-02 RX ORDER — ESTRADIOL 1 MG/1
1 TABLET ORAL DAILY
Qty: 90 TABLET | Refills: 3 | Status: SHIPPED | OUTPATIENT
Start: 2022-11-02 | End: 2023-12-29

## 2022-11-02 RX ORDER — CELECOXIB 200 MG/1
200 CAPSULE ORAL DAILY
Qty: 90 CAPSULE | Refills: 3 | Status: SHIPPED | OUTPATIENT
Start: 2022-11-02 | End: 2023-10-26

## 2022-11-02 RX ORDER — ESTRADIOL 1 MG/1
1 TABLET ORAL DAILY
Qty: 90 TABLET | Refills: 3 | Status: SHIPPED | OUTPATIENT
Start: 2022-11-02 | End: 2022-11-02 | Stop reason: SDUPTHER

## 2022-11-02 RX ORDER — DILTIAZEM HYDROCHLORIDE 30 MG/1
30 TABLET, FILM COATED ORAL EVERY 6 HOURS
Qty: 360 TABLET | Refills: 3 | Status: SHIPPED | OUTPATIENT
Start: 2022-11-02 | End: 2023-09-12

## 2023-02-15 ENCOUNTER — ANESTHESIA (OUTPATIENT)
Dept: ENDOSCOPY | Facility: HOSPITAL | Age: 68
End: 2023-02-15
Payer: MEDICARE

## 2023-02-15 ENCOUNTER — HOSPITAL ENCOUNTER (OUTPATIENT)
Facility: HOSPITAL | Age: 68
Discharge: HOME | End: 2023-02-15
Attending: INTERNAL MEDICINE | Admitting: INTERNAL MEDICINE
Payer: MEDICARE

## 2023-02-15 ENCOUNTER — ANESTHESIA EVENT (OUTPATIENT)
Dept: ENDOSCOPY | Facility: HOSPITAL | Age: 68
End: 2023-02-15
Payer: MEDICARE

## 2023-02-15 VITALS
HEART RATE: 83 BPM | SYSTOLIC BLOOD PRESSURE: 122 MMHG | BODY MASS INDEX: 29.45 KG/M2 | HEIGHT: 60 IN | WEIGHT: 150 LBS | RESPIRATION RATE: 19 BRPM | OXYGEN SATURATION: 98 % | DIASTOLIC BLOOD PRESSURE: 71 MMHG | TEMPERATURE: 98 F

## 2023-02-15 PROCEDURE — C1726 CATH, BAL DIL, NON-VASCULAR: HCPCS | Performed by: INTERNAL MEDICINE

## 2023-02-15 PROCEDURE — 37000008 HC ANESTHESIA 1ST 15 MINUTES: Performed by: INTERNAL MEDICINE

## 2023-02-15 PROCEDURE — 43239 EGD BIOPSY SINGLE/MULTIPLE: CPT | Mod: 59 | Performed by: INTERNAL MEDICINE

## 2023-02-15 PROCEDURE — 43249 ESOPH EGD DILATION <30 MM: CPT | Performed by: INTERNAL MEDICINE

## 2023-02-15 PROCEDURE — 25000003 PHARM REV CODE 250: Performed by: NURSE ANESTHETIST, CERTIFIED REGISTERED

## 2023-02-15 PROCEDURE — 25000003 PHARM REV CODE 250: Performed by: ANESTHESIOLOGY

## 2023-02-15 PROCEDURE — 63600175 PHARM REV CODE 636 W HCPCS: Performed by: NURSE ANESTHETIST, CERTIFIED REGISTERED

## 2023-02-15 PROCEDURE — 37000009 HC ANESTHESIA EA ADD 15 MINS: Performed by: INTERNAL MEDICINE

## 2023-02-15 RX ORDER — SODIUM CHLORIDE, SODIUM GLUCONATE, SODIUM ACETATE, POTASSIUM CHLORIDE AND MAGNESIUM CHLORIDE 30; 37; 368; 526; 502 MG/100ML; MG/100ML; MG/100ML; MG/100ML; MG/100ML
INJECTION, SOLUTION INTRAVENOUS CONTINUOUS
Status: DISCONTINUED | OUTPATIENT
Start: 2023-02-15 | End: 2023-02-15 | Stop reason: HOSPADM

## 2023-02-15 RX ORDER — PROPOFOL 10 MG/ML
VIAL (ML) INTRAVENOUS
Status: COMPLETED
Start: 2023-02-15 | End: 2023-02-15

## 2023-02-15 RX ORDER — LIDOCAINE HYDROCHLORIDE 10 MG/ML
0.5 INJECTION, SOLUTION EPIDURAL; INFILTRATION; INTRACAUDAL; PERINEURAL ONCE
Status: DISCONTINUED | OUTPATIENT
Start: 2023-02-15 | End: 2023-02-15 | Stop reason: HOSPADM

## 2023-02-15 RX ORDER — LIDOCAINE HYDROCHLORIDE 20 MG/ML
INJECTION, SOLUTION EPIDURAL; INFILTRATION; INTRACAUDAL; PERINEURAL
Status: COMPLETED
Start: 2023-02-15 | End: 2023-02-15

## 2023-02-15 RX ORDER — ONDANSETRON 2 MG/ML
4 INJECTION INTRAMUSCULAR; INTRAVENOUS ONCE AS NEEDED
Status: DISCONTINUED | OUTPATIENT
Start: 2023-02-15 | End: 2023-02-15 | Stop reason: HOSPADM

## 2023-02-15 RX ORDER — PROPOFOL 10 MG/ML
VIAL (ML) INTRAVENOUS
Status: DISCONTINUED | OUTPATIENT
Start: 2023-02-15 | End: 2023-02-15

## 2023-02-15 RX ORDER — LIDOCAINE HYDROCHLORIDE 20 MG/ML
INJECTION, SOLUTION EPIDURAL; INFILTRATION; INTRACAUDAL; PERINEURAL
Status: DISCONTINUED | OUTPATIENT
Start: 2023-02-15 | End: 2023-02-15

## 2023-02-15 RX ADMIN — LIDOCAINE HYDROCHLORIDE 5 ML: 20 INJECTION, SOLUTION INTRAVENOUS at 03:02

## 2023-02-15 RX ADMIN — PROPOFOL 50 MG: 10 INJECTION, EMULSION INTRAVENOUS at 03:02

## 2023-02-15 RX ADMIN — PROPOFOL 100 MG: 10 INJECTION, EMULSION INTRAVENOUS at 03:02

## 2023-02-15 RX ADMIN — SODIUM CHLORIDE, SODIUM GLUCONATE, SODIUM ACETATE, POTASSIUM CHLORIDE AND MAGNESIUM CHLORIDE: 526; 502; 368; 37; 30 INJECTION, SOLUTION INTRAVENOUS at 02:02

## 2023-02-15 NOTE — TRANSFER OF CARE
Anesthesia Transfer of Care Note    Patient: Amber Gomez    Procedure(s) Performed: Procedure(s) (LRB):  EGD W/ BOTOX (N/A)  EGD, WITH BALLOON DILATION    Patient location: PACU    Anesthesia Type: general    Transport from OR: Transported from OR on room air with adequate spontaneous ventilation    Post pain: adequate analgesia    Post assessment: no apparent anesthetic complications and tolerated procedure well    Post vital signs: stable    Level of consciousness: responds to stimulation    Nausea/Vomiting: no nausea/vomiting    Complications: none    Transfer of care protocol was followed      Last vitals:   Visit Vitals  BP (!) 116/97 (BP Location: Left arm, Patient Position: Lying)   Pulse 97   Temp 36.5 °C (97.7 °F) (Skin)   Resp 15   Ht 5' (1.524 m)   Wt 68 kg (150 lb)   SpO2 99%   BMI 29.29 kg/m²

## 2023-02-15 NOTE — PROVATION PATIENT INSTRUCTIONS
Discharge Summary/Instructions after an Endoscopic Procedure  Patient Name: Amber Gomez  Patient MRN: [id number]  Patient YOB: 1955  Wednesday, February 15, 2023  Pavel Montalvo MD  Dear patient,  As a result of recent federal legislation (The Federal Cures Act), you may   receive lab or pathology results from your procedure in your [facility]   account before your physician is able to contact you. Your physician or   their representative will relay the results to you with their   recommendations at their soonest availability.  Thank you,  RESTRICTIONS:  During your procedure today, you received medications for sedation.  These   medications may affect your judgment, balance and coordination.  Therefore,   for 24 hours, you have the following restrictions:   - DO NOT drive a car, operate machinery, make legal/financial decisions,   sign important papers or drink alcohol.    ACTIVITY:  Today: no heavy lifting, straining or running due to procedural   sedation/anesthesia.  The following day: return to full activity including work.  DIET:  Eat and drink normally unless instructed otherwise.     TREATMENT FOR COMMON SIDE EFFECTS:  - Mild abdominal pain, nausea, belching, bloating or excessive gas:  rest,   eat lightly and use a heating pad.  - Sore Throat: treat with throat lozenges and/or gargle with warm salt   water.  - Because air was used during the procedure, expelling large amounts of air   from your rectum or belching is normal.  - If a bowel prep was taken, you may not have a bowel movement for 1-3 days.    This is normal.  SYMPTOMS TO WATCH FOR AND REPORT TO YOUR PHYSICIAN:  1. Abdominal pain or bloating, other than gas cramps.  2. Chest pain.  3. Back pain.  4. Signs of infection such as: chills or fever occurring within 24 hours   after the procedure.  5. Rectal bleeding, which would show as bright red, maroon, or black stools.   (A tablespoon of blood from the rectum is not serious, especially  if   hemorrhoids are present.)  6. Vomiting.  7. Weakness or dizziness.  GO DIRECTLY TO THE NEAREST EMERGENCY ROOM IF YOU HAVE ANY OF THE FOLLOWING:      Difficulty breathing              Chills and/or fever over 101 F   Persistent vomiting and/or vomiting blood   Severe abdominal pain   Severe chest pain   Black, tarry stools   Bleeding- more than one tablespoon   Any other symptom or condition that you feel may need urgent attention  Your doctor recommends these additional instructions:  If any biopsies were taken, your doctors clinic will contact you in 1 to 2   weeks with any results.  - Discharge patient to home (with spouse).   - Resume previous diet today.   - Continue present medications.   - Observe patient's clinical course.   - Refer to a surgeon, Dr. Murali Cardona, at appointment to be scheduled for   consideration of Heller Myotomy.   - The findings and recommendations were discussed with the patient and their   spouse.  For questions, problems or results please call your physician - Pavel Montalvo MD at Work:  [phone].  [facility] [place], EMERGENCY ROOM PHONE NUMBER: [phone]  IF A COMPLICATION OR EMERGENCY SITUATION ARISES AND YOU ARE UNABLE TO REACH   YOUR PHYSICIAN - GO DIRECTLY TO THE EMERGENCY ROOM.  Pavel Montalvo MD  2/15/2023 3:47:49 PM  This report has been verified and signed electronically.  Dear patient,  As a result of recent federal legislation (The Federal Cures Act), you may   receive lab or pathology results from your procedure in your [facility]   account before your physician is able to contact you. Your physician or   their representative will relay the results to you with their   recommendations at their soonest availability.  Thank you,  PROVATION

## 2023-02-15 NOTE — ANESTHESIA PREPROCEDURE EVALUATION
02/15/2023  Amber Gomez is a 67 y.o., female with dysphagia, achalasia cardia for:  Pre-operative evaluation for Procedure(s) (LRB):  EGD W/ BOTOX.  Last serum K=3.0 4/15/2021.    Amber Gomez is a 67 y.o. female with multiple surgries and upper and lower GI scopes procedures. Hx colon polyps    Patient Active Problem List   Diagnosis    Dysphagia, pharyngoesophageal phase    Arthritis    Esophageal dysmotility    Achalasia of digestive tract    Gastroesophageal reflux disease    Dilatation of esophagus    Degeneration of intervertebral disc of cervical region    Diaphragmatic hernia without obstruction or gangrene    Diverticulosis of colon    Family history of colonic polyps    History of adenomatous polyp of colon    Malignant neoplasm of kidney    Other and unspecified hyperlipidemia    Sleep apnea    Subclinical hyperthyroidism    Thyroid nodule    Tinnitus    Chest pain    Hip pain       Review of patient's allergies indicates:   Allergen Reactions    Diphenhydramine hcl Other (See Comments)    Hydrocodone-acetaminophen      Other reaction(s): weird feeling       No current facility-administered medications on file prior to encounter.     Current Outpatient Medications on File Prior to Encounter   Medication Sig Dispense Refill    benzonatate (TESSALON) 200 MG capsule Take 1 capsule (200 mg total) by mouth 3 (three) times daily as needed for Cough. 90 capsule 3    calcium-vitamin D 600 mg-10 mcg (400 unit) Tab Take by mouth.      celecoxib (CELEBREX) 200 MG capsule Take 1 capsule (200 mg total) by mouth once daily. 90 capsule 3    diltiaZEM (CARDIZEM) 30 MG tablet Take 1 tablet (30 mg total) by mouth every 6 (six) hours. 360 tablet 3    DULoxetine (CYMBALTA) 60 MG capsule Take 60 mg by mouth once daily.      estradioL (ESTRACE) 1 MG tablet Take 1 tablet (1 mg total) by mouth  once daily. 90 tablet 3    ezetimibe (ZETIA) 10 mg tablet       fenofibrate micronized (LOFIBRA) 67 MG capsule Take 67 mg by mouth.      hydroCHLOROthiazide (HYDRODIURIL) 50 MG tablet once daily.       montelukast (SINGULAIR) 10 mg tablet Take 1 tablet (10 mg total) by mouth once daily. 90 tablet 3    pantoprazole (PROTONIX) 40 MG tablet Take 1 tablet (40 mg total) by mouth once daily. 90 tablet 3    potassium chloride 10% (KAYCIEL) 20 mEq/15 mL oral solution Take 20 mEq by mouth once daily. One tablespoon in the morning and night      simvastatin (ZOCOR) 40 MG tablet   See Instructions, TAKE 1 TABLET BY MOUTH EVERY DAY AT BEDTIME, # 30 tab(s), 11 Refill(s), Pharmacy: [facility] #[id number], 153, cm, Height/Length Dosing, 08/23/21 11:25:00 CDT, 65.77, kg, Weight Dosing, 08/23/21 11:25:00 CDT         Past Surgical History:   Procedure Laterality Date    Arm surgery      Two plates and 17 screws    BACK SURGERY  2010    CARPAL TUNNEL RELEASE  2016    CHOLECYSTECTOMY  2013    COLONOSCOPY  10/06/2022    Repeat 5 yrs due to Diverticulosis    ELBOW SURGERY Right     ESOPHAGEAL MANOMETRY WITH MEASUREMENT OF IMPEDANCE N/A 08/09/2018    Procedure: MANOMETRY, ESOPHAGEAL, WITH IMPEDANCE MEASUREMENT;  Surgeon: Katlyn Em MD;  Location: [address]);  Service: Endoscopy;  Laterality: N/A;  Per Dr. Em ok to book these procedures on the same day    ESOPHAGOGASTRODUODENOSCOPY  2017    ESOPHAGOGASTRODUODENOSCOPY N/A 08/09/2018    Procedure: ESOPHAGOGASTRODUODENOSCOPY (EGD);  Surgeon: Katlyn Em MD;  Location: [address]);  Service: Endoscopy;  Laterality: N/A;    FOOT SURGERY      KNEE SURGERY      PARTIAL HYSTERECTOMY      ROTATOR CUFF REPAIR  2012    SHOULDER SURGERY  2006    SINUS SURGERY  1986    TONSILLECTOMY      TOTAL ABDOMINAL HYSTERECTOMY  1998       CBC: No results for input(s): WBC, RBC, HGB, HCT, PLT, MCV, MCH, MCHC in the last 72 hours.    CMP: No results for  input(s): NA, K, CL, CO2, BUN, CREATININE, GLU, MG, PHOS, CALCIUM, ALBUMIN, PROT, ALKPHOS, ALT, AST, BILITOT in the last 72 hours.    INR  No results for input(s): PT, INR, PROTIME, APTT in the last 72 hours.        Diagnostic Studies:  CXR : nopne recent    EKG : None      2D Echo :  No results found for this or any previous visit..      Pre-op Assessment    I have reviewed the Patient Summary Reports.     I have reviewed the Nursing Notes. I have reviewed the NPO Status.   I have reviewed the Medications.     Review of Systems  Anesthesia Hx:  No problems with previous Anesthesia  History of prior surgery of interest to airway management or planning: Previous anesthesia: General ASHOK: ; Sinus Sx:; EGDs, RCR with general anesthesia.  Airway issues documented on chart review include GETA  Denies Family Hx of Anesthesia complications.   Denies Personal Hx of Anesthesia complications.   Social:  Non-Smoker, Social Alcohol Use    Hematology/Oncology:  Hematology Normal   Oncology Normal   Oncology Comments: Malignant Neoplasm of Kidney        EENT/Dental:  EENT/Dental Normal tinitus   Cardiovascular:   Exercise tolerance: good Hypertension    Pulmonary:   Sleep Apnea, CPAP Non compliant CPAP Used x 7 yrs,m then quit x sev yrs... bothers her.   Education provided regarding risk of obstructive sleep apnea     Renal/:  Renal/ Normal  Denies Chronic Renal Disease.     Hepatic/GI:   GERD History of Adenomatous Polyp of Colon 5/17/2022   Musculoskeletal:   Arthritis  Degen of Intervertebral Disc of Cervical Region  Spine Disorders: cervical Degenerative disease and Disc disease    Neurological:   Neuromuscular Disease,    Endocrine:   Hyperthyroidism    Dermatological:  Skin Normal    Psych:  Psychiatric Normal           Physical Exam  General: Well nourished, Cooperative, Alert and Oriented    Airway:  Mallampati: III / III  Mouth Opening: Normal  TM Distance: 4 - 6 cm  Tongue: Normal  Neck ROM: Normal ROM  Short neck  and MHyopid distance< 1/2 inch.  Dental:  Intact  Px denies any loose teeth.  Chest/Lungs:  Clear to auscultation, Normal Respiratory Rate    Heart:  Rate: Normal  Rhythm: Regular Rhythm    Abdomen:  Normal, Soft        Anesthesia Plan  Type of Anesthesia, risks & benefits discussed:    Anesthesia Type: Gen Natural Airway  Intra-op Monitoring Plan: Standard ASA Monitors  Induction:  IV  Informed Consent: Informed consent signed with the Patient and all parties understand the risks and agree with anesthesia plan.  All questions answered.   ASA Score: 3  Day of Surgery Review of History & Physical: H&P Update referred to the surgeon/provider.I have interviewed and examined the patient. I have reviewed the patient's H&P dated:   Anesthesia Plan Notes: TIVA with mask/NC, GA back-up.     Ready For Surgery From Anesthesia Perspective.     .

## 2023-02-15 NOTE — H&P
Endoscopy History and Physical    PCP - Aurora Christie MD    Procedure - EGD with botox & balloon dilation  ASA & Mallampati - per anesthesia      HPI:  This is a 67 y.o. female here for evaluation of hypertonic LES and dysphagia:       ROS:  Constitutional: No fevers, chills, No weight loss  ENT: No allergies  CV: No chest pain  Pulm: No shortness of breath  GI: see HPI  Derm: No rash    Medical History:  has a past medical history of Achalasia, Cancer of left kidney, Diverticulitis, Dysphagia, Fibroid tumor, High cholesterol, Osteoarthritis of both hips, Osteoporosis, and S/P ASHOK (total abdominal hysterectomy).    Surgical History:  has a past surgical history that includes Sinus surgery (1986); Partial hysterectomy; Knee surgery; Shoulder surgery (2006); Back surgery (2010); Rotator cuff repair (2012); Cholecystectomy (2013); Carpal tunnel release (2016); Esophagogastroduodenoscopy (2017); Colonoscopy (10/06/2022); Tonsillectomy; Esophageal manometry with measurement of impedance (N/A, 08/09/2018); Esophagogastroduodenoscopy (N/A, 08/09/2018); Total abdominal hysterectomy (1998); Elbow surgery (Right); Foot surgery; and Arm surgery.    Family History: family history includes Heart defect in her father; Stomach cancer (age of onset: 67) in her brother.     Social History:  reports that she has never smoked. She has never used smokeless tobacco. She reports that she does not drink alcohol and does not use drugs.    Review of patient's allergies indicates:   Allergen Reactions    Diphenhydramine hcl Other (See Comments)    Hydrocodone-acetaminophen      Other reaction(s): weird feeling       Medications:   Medications Prior to Admission   Medication Sig Dispense Refill Last Dose    benzonatate (TESSALON) 200 MG capsule Take 1 capsule (200 mg total) by mouth 3 (three) times daily as needed for Cough. 90 capsule 3 Taking    calcium-vitamin D 600 mg-10 mcg (400 unit) Tab Take by mouth.   Taking    celecoxib (CELEBREX)  200 MG capsule Take 1 capsule (200 mg total) by mouth once daily. 90 capsule 3 Taking    diltiaZEM (CARDIZEM) 30 MG tablet Take 1 tablet (30 mg total) by mouth every 6 (six) hours. 360 tablet 3 Taking    DULoxetine (CYMBALTA) 60 MG capsule Take 60 mg by mouth once daily.   Taking    estradioL (ESTRACE) 1 MG tablet Take 1 tablet (1 mg total) by mouth once daily. 90 tablet 3 Taking    ezetimibe (ZETIA) 10 mg tablet    Taking    fenofibrate micronized (LOFIBRA) 67 MG capsule Take 67 mg by mouth.   Taking    hydroCHLOROthiazide (HYDRODIURIL) 50 MG tablet once daily.    Taking    montelukast (SINGULAIR) 10 mg tablet Take 1 tablet (10 mg total) by mouth once daily. 90 tablet 3 Taking    pantoprazole (PROTONIX) 40 MG tablet Take 1 tablet (40 mg total) by mouth once daily. 90 tablet 3 Taking    potassium chloride 10% (KAYCIEL) 20 mEq/15 mL oral solution Take 20 mEq by mouth once daily. One tablespoon in the morning and night   Taking    simvastatin (ZOCOR) 40 MG tablet   See Instructions, TAKE 1 TABLET BY MOUTH EVERY DAY AT BEDTIME, # 30 tab(s), 11 Refill(s), Pharmacy: [facility] #[id number], 153, cm, Height/Length Dosing, 08/23/21 11:25:00 CDT, 65.77, kg, Weight Dosing, 08/23/21 11:25:00 CDT   Taking         Objective Findings:    Vital Signs: see nursing notes  Physical Exam:  General Appearance: WF, well appearing in no acute distress  Neuro: A&O x 3, no focal deficits  Eyes:    No scleral icterus  ENT: Neck supple  Lungs: CTA anteriorly  Heart:  S1, S2 normal, no murmurs heard  Abdomen: Soft, non tender, non distended with positive bowel sounds. No hepatosplenomegaly, ascites, or mass  Extremities: no edema  Skin: No rash      Labs:  Lab Results   Component Value Date    WBC 9.3 08/29/2020    HGB 8.6 (L) 08/29/2020    HCT 26.6 (L) 08/29/2020     08/29/2020    ALT 22 08/10/2018    AST 16 08/10/2018     04/15/2021    K 3.6 04/15/2021     08/10/2018    CREATININE 0.82 04/15/2021    BUN 19 (H)  04/15/2021    CO2 26 04/15/2021    TSH 0.189 (L) 08/10/2018       I have explained the risks and benefits of endoscopy procedures to the patient including but not limited to bleeding, perforation, infection, and death.    Pavel Montalvo MD

## 2023-02-15 NOTE — ANESTHESIA POSTPROCEDURE EVALUATION
Anesthesia Post Evaluation    Patient: Amber Gomez    Procedure(s) Performed: Procedure(s) (LRB):  EGD W/ BOTOX (N/A)  EGD, WITH BALLOON DILATION    Final Anesthesia Type: general      Patient location during evaluation: PACU  Patient participation: Yes- Able to Participate  Level of consciousness: awake and alert  Post-procedure vital signs: reviewed and stable  Pain management: adequate  Airway patency: patent    PONV status at discharge: No PONV  Anesthetic complications: no      Cardiovascular status: hemodynamically stable  Respiratory status: unassisted  Hydration status: euvolemic  Follow-up not needed.          Vitals Value Taken Time   /71 02/15/23 1542   Temp 36.5 °C (97.7 °F) 02/15/23 1520   Pulse 83 02/15/23 1542   Resp 19 02/15/23 1542   SpO2 98 % 02/15/23 1542         No case tracking events are documented in the log.      Pain/Adal Score: Adal Score: 10 (2/15/2023  3:42 PM)

## 2023-02-23 DIAGNOSIS — K22.0 ACHALASIA: Primary | ICD-10-CM

## 2023-03-03 ENCOUNTER — TELEPHONE (OUTPATIENT)
Dept: GASTROENTEROLOGY | Facility: CLINIC | Age: 68
End: 2023-03-03
Payer: MEDICARE

## 2023-03-03 NOTE — TELEPHONE ENCOUNTER
Ma called pt   No answer message left on pt vm   Pt was seen By Dr. Em in 2018 and was to follow up in 2019  Ma calling to schedule Np process

## 2023-03-14 ENCOUNTER — TELEPHONE (OUTPATIENT)
Dept: GASTROENTEROLOGY | Facility: CLINIC | Age: 68
End: 2023-03-14
Payer: MEDICARE

## 2023-03-15 ENCOUNTER — TELEPHONE (OUTPATIENT)
Dept: INTERNAL MEDICINE | Facility: CLINIC | Age: 68
End: 2023-03-15
Payer: MEDICARE

## 2023-03-15 NOTE — TELEPHONE ENCOUNTER
----- Message from Hanh Leggett LPN sent at 3/15/2023  9:41 AM CDT -----  Regarding: MATTHEW Christie 3/22/23 @10:00a  Are there any outstanding tasks in the chart? Pt will need nonfasting labs    Is there any documentation of tasks? no    Has patient seen another physician, been to the ER, UCC, or admitted to hospital since last visit?    Has the patient done blood work or imaging since last visit?

## 2023-03-20 LAB
BUN SERPL-MCNC: 15 MG/DL (ref 8–27)
BUN/CREAT SERPL: 16 (ref 12–28)
CALCIUM SERPL-MCNC: 10 MG/DL (ref 8.7–10.3)
CHLORIDE SERPL-SCNC: 100 MMOL/L (ref 96–106)
CO2 SERPL-SCNC: 23 MMOL/L (ref 20–29)
CREAT SERPL-MCNC: 0.91 MG/DL (ref 0.57–1)
EST. GFR  (NO RACE VARIABLE): 70 ML/MIN/1.73
GLUCOSE SERPL-MCNC: 106 MG/DL (ref 70–99)
POTASSIUM SERPL-SCNC: 4.3 MMOL/L (ref 3.5–5.2)
SODIUM SERPL-SCNC: 140 MMOL/L (ref 134–144)
SPECIMEN STATUS REPORT: NORMAL

## 2023-03-22 ENCOUNTER — OFFICE VISIT (OUTPATIENT)
Dept: INTERNAL MEDICINE | Facility: CLINIC | Age: 68
End: 2023-03-22
Payer: MEDICARE

## 2023-03-22 VITALS
WEIGHT: 153 LBS | DIASTOLIC BLOOD PRESSURE: 70 MMHG | OXYGEN SATURATION: 99 % | SYSTOLIC BLOOD PRESSURE: 122 MMHG | BODY MASS INDEX: 30.04 KG/M2 | HEART RATE: 93 BPM | HEIGHT: 60 IN | RESPIRATION RATE: 18 BRPM

## 2023-03-22 DIAGNOSIS — H93.19 TINNITUS, UNSPECIFIED LATERALITY: ICD-10-CM

## 2023-03-22 DIAGNOSIS — I10 BENIGN HYPERTENSION: Primary | ICD-10-CM

## 2023-03-22 DIAGNOSIS — K22.0 ACHALASIA OF DIGESTIVE TRACT: ICD-10-CM

## 2023-03-22 DIAGNOSIS — Z85.528 HISTORY OF RENAL CELL CARCINOMA: ICD-10-CM

## 2023-03-22 DIAGNOSIS — E05.90 SUBCLINICAL HYPERTHYROIDISM: ICD-10-CM

## 2023-03-22 DIAGNOSIS — E78.5 HYPERLIPIDEMIA, UNSPECIFIED HYPERLIPIDEMIA TYPE: ICD-10-CM

## 2023-03-22 DIAGNOSIS — R73.09 ELEVATED GLUCOSE: ICD-10-CM

## 2023-03-22 PROBLEM — K22.2 ESOPHAGEAL OBSTRUCTION: Status: ACTIVE | Noted: 2023-03-22

## 2023-03-22 PROBLEM — K21.9 GASTRO-ESOPHAGEAL REFLUX DISEASE WITHOUT ESOPHAGITIS: Status: ACTIVE | Noted: 2023-03-22

## 2023-03-22 PROBLEM — K82.9 DISEASE OF GALLBLADDER, UNSPECIFIED: Status: RESOLVED | Noted: 2023-03-22 | Resolved: 2023-03-22

## 2023-03-22 PROBLEM — Z86.0100 PERSONAL HISTORY OF COLONIC POLYPS: Status: ACTIVE | Noted: 2022-05-17

## 2023-03-22 PROBLEM — K82.9 DISEASE OF GALLBLADDER, UNSPECIFIED: Status: ACTIVE | Noted: 2023-03-22

## 2023-03-22 PROCEDURE — 99214 PR OFFICE/OUTPT VISIT, EST, LEVL IV, 30-39 MIN: ICD-10-PCS | Mod: ,,, | Performed by: INTERNAL MEDICINE

## 2023-03-22 PROCEDURE — 99214 OFFICE O/P EST MOD 30 MIN: CPT | Mod: ,,, | Performed by: INTERNAL MEDICINE

## 2023-03-22 NOTE — PROGRESS NOTES
Subjective:      Chief Complaint: Follow-up (6mo/C/o dealing with a head cold X3wk /C/o muscle spasms in rib cage area- mostly when she is coughing )      HPI:  She is here for f/u htn, hld, achalasia.  She had a dilation of the esophagus with Dr. Montalvo.  Its been dilated twice, and he did botox.  She has an appt with Dr. Murali Cardona for the esophageal dysmotility.  She is swallowing ok right now.  She still occ vomits, but it seems like its coming from her stomach.  But that is more rare now than it used to be.    She is checking her BP a bernie.  Its been good.  It might be low in the mornings, but more normal in the afternoons.  She was having some dizziness when she was sick with a cold, but she doesn't normally feel dizzy or light headed.  Problem Noted   Benign Hypertension 3/22/2023   Esophageal Obstruction 3/22/2023   Tinnitus 8/25/2022   Hip Pain 8/25/2022   Arthritis 5/17/2022   Achalasia of Digestive Tract 5/17/2022   Gastroesophageal Reflux Disease 5/17/2022   Dilatation of Esophagus 5/17/2022    STATES DILATED ABOUT EVERY 2 YEARS     Degeneration of Intervertebral Disc of Cervical Region 5/17/2022   Personal History of Colonic Polyps 5/17/2022   History of Renal Cell Carcinoma 5/17/2022    She is followed by Dr. Vera, dx'ed in approx. 2017     Hyperlipidemia 5/17/2022    SAW DR ZARATE 1/4/2016 CAROTID US DONE AT THAT TIME     Sleep Apnea 5/17/2022    non compliant with CPAP     Subclinical Hyperthyroidism 5/17/2022   Thyroid Nodule 5/17/2022    followed by Dr. Davis     Diverticulosis of Colon 11/8/2013   Family History of Colonic Polyps 11/8/2013   Diaphragmatic Hernia Without Obstruction Or Gangrene 9/20/2013   Diverticulum of Esophagus, Acquired 9/20/2013   Disease of Gallbladder, Unspecified (Resolved) 3/22/2023   Palpitations (Resolved) 8/25/2022        The patient's Health Maintenance was reviewed and the following appears to be due:   Health Maintenance Due   Topic Date Due    Hepatitis  C Screening  Never done    Pneumococcal Vaccines (Age 65+) (1 - PCV) Never done    HIV Screening  Never done    Hemoglobin A1c (Diabetic Prevention Screening)  Never done    Shingles Vaccine (2 of 2) 03/13/2020    COVID-19 Vaccine (4 - Booster for Nhi series) 01/17/2022    Mammogram  05/10/2023       Past Medical History:  Past Medical History:   Diagnosis Date    Achalasia     Cancer of left kidney     Disease of gallbladder, unspecified 3/22/2023    Diverticulitis     Dysphagia     Fibroid tumor     positive for cancer    High cholesterol     Osteoarthritis of both hips     Osteoporosis     S/P ASHOK (total abdominal hysterectomy)     had a fibroid     Review of patient's allergies indicates:   Allergen Reactions    Diphenhydramine hcl Other (See Comments)    Hydrocodone-acetaminophen      Other reaction(s): weird feeling     Current Outpatient Medications on File Prior to Visit   Medication Sig Dispense Refill    benzonatate (TESSALON) 200 MG capsule Take 1 capsule (200 mg total) by mouth 3 (three) times daily as needed for Cough. 90 capsule 3    calcium-vitamin D 600 mg-10 mcg (400 unit) Tab Take by mouth.      celecoxib (CELEBREX) 200 MG capsule Take 1 capsule (200 mg total) by mouth once daily. 90 capsule 3    diltiaZEM (CARDIZEM) 30 MG tablet Take 1 tablet (30 mg total) by mouth every 6 (six) hours. 360 tablet 3    DULoxetine (CYMBALTA) 60 MG capsule Take 60 mg by mouth once daily.      estradioL (ESTRACE) 1 MG tablet Take 1 tablet (1 mg total) by mouth once daily. 90 tablet 3    ezetimibe (ZETIA) 10 mg tablet       fenofibrate micronized (LOFIBRA) 67 MG capsule Take 67 mg by mouth.      hydroCHLOROthiazide (HYDRODIURIL) 50 MG tablet once daily.       montelukast (SINGULAIR) 10 mg tablet Take 1 tablet (10 mg total) by mouth once daily. 90 tablet 3    pantoprazole (PROTONIX) 40 MG tablet Take 1 tablet (40 mg total) by mouth once daily. 90 tablet 3    potassium chloride 10% (KAYCIEL) 20 mEq/15 mL oral  solution Take 20 mEq by mouth once daily. One tablespoon in the morning and night      simvastatin (ZOCOR) 40 MG tablet   See Instructions, TAKE 1 TABLET BY MOUTH EVERY DAY AT BEDTIME, # 30 tab(s), 11 Refill(s), Pharmacy: [id number], 153, cm, Height/Length Dosing, 08/23/21 11:25:00 CDT, 65.77, kg, Weight Dosing, 08/23/21 11:25:00 CDT       No current facility-administered medications on file prior to visit.       Review of Systems    Objective:   /70 (BP Location: Right arm, Patient Position: Sitting, BP Method: Small (Manual))   Pulse 93   Resp 18   Ht 5' (1.524 m)   Wt 69.4 kg (153 lb)   SpO2 99%   BMI 29.88 kg/m²     Physical Exam  Constitutional:       General: She is not in acute distress.     Appearance: Normal appearance.   HENT:      Head: Normocephalic and atraumatic.   Eyes:      General: No scleral icterus.     Conjunctiva/sclera: Conjunctivae normal.   Neck:      Vascular: No carotid bruit.   Cardiovascular:      Rate and Rhythm: Normal rate and regular rhythm.      Pulses: Normal pulses.      Heart sounds: Normal heart sounds. No murmur heard.    No friction rub. No gallop.   Pulmonary:      Effort: Pulmonary effort is normal.      Breath sounds: Normal breath sounds.   Abdominal:      General: Bowel sounds are normal.      Palpations: Abdomen is soft. There is no mass.      Tenderness: There is no abdominal tenderness. There is no guarding or rebound.   Musculoskeletal:         General: No deformity.      Cervical back: No rigidity or tenderness.      Right lower leg: No edema.      Left lower leg: No edema.   Lymphadenopathy:      Cervical: No cervical adenopathy.   Skin:     Coloration: Skin is not jaundiced or pale.      Findings: No erythema.   Neurological:      General: No focal deficit present.      Mental Status: She is alert and oriented to person, place, and time.      Gait: Gait normal.   Psychiatric:         Mood and Affect: Mood normal.         Behavior:  Behavior normal.         Thought Content: Thought content normal.         Judgment: Judgment normal.     Assessment and Plan:     Hyperlipidemia  Lipids were checked by Dr. Larios in January and it was good.    Tinnitus  She is now on cymbalta, which has helped.    Subclinical hyperthyroidism  Recheck 6 mos.    Benign hypertension  Controlled, cont hctz.    Achalasia of digestive tract  Sl improved recently.  She is seeing Katia and has an appt with Dr. Cardona.      Follow up in about 6 months (around 9/22/2023).       [unfilled]  Orders Placed This Encounter   Procedures    Hemoglobin A1C     Standing Status:   Future     Standing Expiration Date:   5/20/2024    TSH     Standing Status:   Future     Standing Expiration Date:   5/20/2024    T4, Free     Standing Status:   Future     Standing Expiration Date:   5/20/2024    CBC Auto Differential     Standing Status:   Future     Standing Expiration Date:   5/20/2024    Comprehensive Metabolic Panel     Standing Status:   Future     Standing Expiration Date:   5/20/2024    Lipid Panel     Standing Status:   Future     Standing Expiration Date:   9/22/2024

## 2023-03-23 ENCOUNTER — DOCUMENTATION ONLY (OUTPATIENT)
Dept: INTERNAL MEDICINE | Facility: CLINIC | Age: 68
End: 2023-03-23
Payer: MEDICARE

## 2023-04-13 ENCOUNTER — TELEPHONE (OUTPATIENT)
Dept: INTERNAL MEDICINE | Facility: CLINIC | Age: 68
End: 2023-04-13
Payer: MEDICARE

## 2023-04-13 NOTE — TELEPHONE ENCOUNTER
I sent a rx for Paxlovid to [facility].  She should hold the simvastatin while she is taking the Paxlovid.  Otherwise, she can take afrin for congestion, benadryl at night for cough to help her rest, tylenol for headaches or body aches, mucinex if she has thick secretions.

## 2023-04-19 ENCOUNTER — TELEPHONE (OUTPATIENT)
Dept: GASTROENTEROLOGY | Facility: CLINIC | Age: 68
End: 2023-04-19
Payer: MEDICARE

## 2023-05-17 ENCOUNTER — OFFICE VISIT (OUTPATIENT)
Dept: SURGICAL ONCOLOGY | Facility: CLINIC | Age: 68
End: 2023-05-17
Payer: MEDICARE

## 2023-05-17 VITALS
HEIGHT: 60 IN | WEIGHT: 151.19 LBS | SYSTOLIC BLOOD PRESSURE: 125 MMHG | HEART RATE: 94 BPM | BODY MASS INDEX: 29.68 KG/M2 | DIASTOLIC BLOOD PRESSURE: 83 MMHG

## 2023-05-17 DIAGNOSIS — K22.0 ACHALASIA: ICD-10-CM

## 2023-05-17 PROCEDURE — 99204 PR OFFICE/OUTPT VISIT, NEW, LEVL IV, 45-59 MIN: ICD-10-PCS | Mod: S$PBB,,, | Performed by: SURGERY

## 2023-05-17 PROCEDURE — 99999 PR PBB SHADOW E&M-EST. PATIENT-LVL IV: ICD-10-PCS | Mod: PBBFAC,,, | Performed by: SURGERY

## 2023-05-17 PROCEDURE — 99204 OFFICE O/P NEW MOD 45 MIN: CPT | Mod: S$PBB,,, | Performed by: SURGERY

## 2023-05-17 PROCEDURE — 99214 OFFICE O/P EST MOD 30 MIN: CPT | Mod: PBBFAC | Performed by: SURGERY

## 2023-05-17 PROCEDURE — 99999 PR PBB SHADOW E&M-EST. PATIENT-LVL IV: CPT | Mod: PBBFAC,,, | Performed by: SURGERY

## 2023-05-17 NOTE — PROGRESS NOTES
History & Physical    CHIEF COMPLAINT:  ACHALASIA     History of Present Illness:  67 year-old-female referred by Dr. Montalvo.  Patient has a known small hiatal hernia and a tortuous esophagus dating back to 2012.  An esophageal motility done in 2017 revealed diffuse esophageal spasms.  An esophagram in 2018 showed a marked distal esophageal dysmotility with tertiary contractions., and a stricture  around the GE junction.  A second esophagram done in 2018 showed a tortuous esophagus with narrowing of the distal esophagus near the GE junction; significant esophageal dysmotility, a small hiatal hernia, and spontaneous GERD.  She presented to him in October 2022 with complaints of esophageal spasms, GERD, dysphagia and achalasia. EGD was performed, an esophageal hiatal hernia was seen, a hypercontractile esophagus noted, stenosis in the duodenal site.  Esophageal motility study performed in November, findings of outflow obstruction and significant pressurization/spasm consistent with type 3 achalasia.   EGD performed in February showed gastroesophageal flap valve classified as Hill Grade I (prominent fold, tight to endoscope).  She has had multiple dilations over the years with some Botox injections as well with only temporary relief with symptoms always recurring and becoming severe again.  She has been referred for consideration of Heller myotomy.      Greater than 45 minutes was required for complete chart review, imaging review including interpretation of imaging, coordination with referring/other physicians, patient counseling regarding diagnosis/treatment plan, answering questions, medical decision making, and documentation.    Review of patient's allergies indicates:   Allergen Reactions    Diphenhydramine hcl Other (See Comments)    Hydrocodone-acetaminophen      Other reaction(s): weird feeling       Current Outpatient Medications   Medication Sig Dispense Refill    benzonatate (TESSALON) 200 MG capsule Take 1  capsule (200 mg total) by mouth 3 (three) times daily as needed for Cough. 90 capsule 3    calcium-vitamin D 600 mg-10 mcg (400 unit) Tab Take by mouth.      celecoxib (CELEBREX) 200 MG capsule Take 1 capsule (200 mg total) by mouth once daily. 90 capsule 3    diltiaZEM (CARDIZEM) 30 MG tablet Take 1 tablet (30 mg total) by mouth every 6 (six) hours. 360 tablet 3    DULoxetine (CYMBALTA) 60 MG capsule Take 60 mg by mouth once daily.      estradioL (ESTRACE) 1 MG tablet Take 1 tablet (1 mg total) by mouth once daily. 90 tablet 3    ezetimibe (ZETIA) 10 mg tablet       fenofibrate micronized (LOFIBRA) 67 MG capsule Take 67 mg by mouth.      hydroCHLOROthiazide (HYDRODIURIL) 50 MG tablet once daily.       montelukast (SINGULAIR) 10 mg tablet Take 1 tablet (10 mg total) by mouth once daily. 90 tablet 3    pantoprazole (PROTONIX) 40 MG tablet Take 1 tablet (40 mg total) by mouth once daily. 90 tablet 3    potassium chloride 10% (KAYCIEL) 20 mEq/15 mL oral solution Take 20 mEq by mouth once daily. One tablespoon in the morning and night      simvastatin (ZOCOR) 40 MG tablet   See Instructions, TAKE 1 TABLET BY MOUTH EVERY DAY AT BEDTIME, # 30 tab(s), 11 Refill(s), Pharmacy: [id number], 153, cm, Height/Length Dosing, 08/23/21 11:25:00 CDT, 65.77, kg, Weight Dosing, 08/23/21 11:25:00 CDT       No current facility-administered medications for this visit.       Past Medical History:   Diagnosis Date    Achalasia     Cancer of left kidney     Disease of gallbladder, unspecified 3/22/2023    Diverticulitis     Dysphagia     Fibroid tumor     positive for cancer    High cholesterol     Osteoarthritis of both hips     Osteoporosis     S/P ASHOK (total abdominal hysterectomy)     had a fibroid     Past Surgical History:   Procedure Laterality Date    ADENOIDECTOMY  1969    Arm surgery      Two plates and 17 screws    BACK SURGERY  2010    CARPAL TUNNEL RELEASE  2016    CHOLECYSTECTOMY  2013    COLONOSCOPY   10/06/2022    Repeat 5 yrs due to Diverticulosis    EGD, WITH BALLOON DILATION  02/15/2023    Procedure: EGD, WITH BALLOON DILATION;  Surgeon: Pavel Montalvo MD;  Location: [facility] ENDOSCOPY;  Service: Gastroenterology;;  Balloon Dialation CRE 18,19.20    ELBOW SURGERY Right     ESOPHAGEAL MANOMETRY WITH MEASUREMENT OF IMPEDANCE N/A 08/09/2018    Procedure: MANOMETRY, ESOPHAGEAL, WITH IMPEDANCE MEASUREMENT;  Surgeon: Katlyn Em MD;  Location: [facility] (4TH FLR);  Service: Endoscopy;  Laterality: N/A;  Per Dr. Em ok to book these procedures on the same day    ESOPHAGOGASTRODUODENOSCOPY  2017    ESOPHAGOGASTRODUODENOSCOPY N/A 08/09/2018    Procedure: ESOPHAGOGASTRODUODENOSCOPY (EGD);  Surgeon: Katlyn Em MD;  Location: [facility] ENDO (4TH FLR);  Service: Endoscopy;  Laterality: N/A;    ESOPHAGOGASTRODUODENOSCOPY N/A 02/15/2023    Procedure: EGD W/ BOTOX;  Surgeon: Pavel Montalvo MD;  Location: [facility] ENDOSCOPY;  Service: Gastroenterology;  Laterality: N/A;  EGD w/ Botox of the LES & balloon dilation to 20mm    FOOT SURGERY      KNEE SURGERY      PARTIAL HYSTERECTOMY      ROTATOR CUFF REPAIR  2012    SHOULDER SURGERY  2006    SINUS SURGERY  1986    SPINE SURGERY  2010    TONSILLECTOMY      TOTAL ABDOMINAL HYSTERECTOMY  1998     Family History   Problem Relation Age of Onset    Arthritis Mother     Diabetes Mother     Kidney disease Mother         Pass away from kidney problem    Heart defect Father     Arthritis Father     Heart disease Father     Stomach cancer Brother 67    Cancer Brother         Passaway from cancer    Arthritis Sister     Celiac disease Neg Hx     Colon cancer Neg Hx     Crohn's disease Neg Hx     Esophageal cancer Neg Hx     Inflammatory bowel disease Neg Hx     Irritable bowel syndrome Neg Hx     Liver cancer Neg Hx     Rectal cancer Neg Hx     Ulcerative colitis Neg Hx      Social History     Tobacco Use    Smoking status: Never    Smokeless tobacco: Never   Substance Use  Topics    Alcohol use: No    Drug use: No        Review of Systems:  Review of Systems   Constitutional:  Negative for appetite change, chills, diaphoresis and fever.   HENT:  Negative for congestion, drooling, ear discharge, ear pain and hearing loss.    Eyes:  Negative for discharge.   Respiratory:  Negative for apnea, cough, choking, chest tightness, shortness of breath and stridor.    Cardiovascular:  Negative for chest pain, palpitations and leg swelling.   Endocrine: Negative for cold intolerance and heat intolerance.   Genitourinary:  Negative for difficulty urinating, dyspareunia, dysuria and hematuria.   Musculoskeletal:  Negative for arthralgias, gait problem and joint swelling.   Skin:  Negative for color change and rash.   Neurological:  Negative for dizziness, tremors, seizures, syncope, facial asymmetry, speech difficulty, light-headedness, numbness and headaches.   Psychiatric/Behavioral:  Negative for agitation and confusion.      OBJECTIVE:     Vital Signs (Most Recent)              Physical Exam:  Physical Exam  Constitutional:       General: She is not in acute distress.     Appearance: Normal appearance. She is not toxic-appearing.   HENT:      Head: Normocephalic and atraumatic.      Right Ear: External ear normal.      Left Ear: External ear normal.      Mouth/Throat:      Mouth: Mucous membranes are moist.   Eyes:      General: No scleral icterus.     Conjunctiva/sclera: Conjunctivae normal.      Pupils: Pupils are equal, round, and reactive to light.   Cardiovascular:      Rate and Rhythm: Normal rate and regular rhythm.      Pulses: Normal pulses.   Pulmonary:      Effort: Pulmonary effort is normal. No respiratory distress.      Breath sounds: Normal breath sounds. No stridor. No wheezing or rhonchi.   Abdominal:      General: Abdomen is flat. There is no distension.      Palpations: Abdomen is soft. There is no mass.      Tenderness: There is no abdominal tenderness. There is no guarding  or rebound.      Hernia: No hernia is present.   Musculoskeletal:         General: No swelling or tenderness. Normal range of motion.      Cervical back: Normal range of motion and neck supple.      Right lower leg: No edema.      Left lower leg: No edema.   Skin:     General: Skin is warm.      Capillary Refill: Capillary refill takes less than 2 seconds.      Coloration: Skin is not jaundiced or pale.      Findings: No erythema or rash.   Neurological:      General: No focal deficit present.      Mental Status: She is alert and oriented to person, place, and time.      Gait: Gait normal.   Psychiatric:         Mood and Affect: Mood normal.         Behavior: Behavior normal.         Judgment: Judgment normal.         ASSESSMENT/PLAN:     Type 3 achalasia with significant symptoms, refractory to non surgical management    Diagnosis, pathophysiology and treatment guidelines were discussed in detail. Using visual aids and drawings, I described the anatomy and potential surgical procedures.    Schedule laparoscopic/robotic Heller myotomy, possible right thoracoscopic approach robotically for long myotomy if not able to performed through the hiatus.  Partial fundoplication, intraoperative EGD    Preoperative cardiac evaluation with Dr. Larios    The risks and benefits of the procedure were explained in detail using layman terms, including the pros and cons of any implant that may be used, all questions were addressed, the patient gives consent to proceed    Murali Cardona MD  Surgical Oncology  Complex General, Gastrointestinal and Hepatobiliary Surgery

## 2023-05-18 DIAGNOSIS — K22.0 ACHALASIA: Primary | ICD-10-CM

## 2023-05-18 DIAGNOSIS — Z01.818 PREOP TESTING: ICD-10-CM

## 2023-05-18 RX ORDER — ENOXAPARIN SODIUM 300 MG/3ML
30 INJECTION INTRAVENOUS; SUBCUTANEOUS EVERY 24 HOURS
Status: CANCELLED | OUTPATIENT
Start: 2023-05-18

## 2023-05-18 RX ORDER — ALVIMOPAN 12 MG/1
12 CAPSULE ORAL ONCE
Status: CANCELLED | OUTPATIENT
Start: 2023-06-09 | End: 2023-06-15

## 2023-06-05 ENCOUNTER — HOSPITAL ENCOUNTER (OUTPATIENT)
Dept: RADIOLOGY | Facility: HOSPITAL | Age: 68
Discharge: HOME | End: 2023-06-05
Attending: SURGERY
Payer: MEDICARE

## 2023-06-05 DIAGNOSIS — Z01.818 PREOP TESTING: ICD-10-CM

## 2023-06-05 DIAGNOSIS — K22.0 ACHALASIA: ICD-10-CM

## 2023-06-05 PROCEDURE — 71045 X-RAY EXAM CHEST 1 VIEW: CPT | Mod: TC

## 2023-06-06 ENCOUNTER — ANESTHESIA EVENT (OUTPATIENT)
Dept: SURGERY | Facility: HOSPITAL | Age: 68
DRG: 328 | End: 2023-06-06
Payer: MEDICARE

## 2023-06-09 ENCOUNTER — ANESTHESIA (OUTPATIENT)
Dept: SURGERY | Facility: HOSPITAL | Age: 68
DRG: 328 | End: 2023-06-09
Payer: MEDICARE

## 2023-06-09 ENCOUNTER — HOSPITAL ENCOUNTER (INPATIENT)
Facility: HOSPITAL | Age: 68
LOS: 1 days | Discharge: HOME | DRG: 328 | End: 2023-06-10
Attending: SURGERY | Admitting: SURGERY
Payer: MEDICARE

## 2023-06-09 DIAGNOSIS — K22.0 ACHALASIA: Primary | ICD-10-CM

## 2023-06-09 LAB
GROUP & RH: NORMAL
INDIRECT COOMBS GEL: NORMAL
SPECIMEN OUTDATE: NORMAL

## 2023-06-09 PROCEDURE — 43279 LAP MYOTOMY HELLER: CPT | Mod: ,,, | Performed by: SURGERY

## 2023-06-09 PROCEDURE — 76937 US GUIDE VASCULAR ACCESS: CPT | Performed by: ANESTHESIOLOGY

## 2023-06-09 PROCEDURE — C9290 INJ, BUPIVACAINE LIPOSOME: HCPCS | Performed by: SURGERY

## 2023-06-09 PROCEDURE — 76937 ARTERIAL: ICD-10-PCS | Mod: 26,,, | Performed by: ANESTHESIOLOGY

## 2023-06-09 PROCEDURE — 63600175 PHARM REV CODE 636 W HCPCS: Performed by: NURSE ANESTHETIST, CERTIFIED REGISTERED

## 2023-06-09 PROCEDURE — 86900 BLOOD TYPING SEROLOGIC ABO: CPT | Performed by: SURGERY

## 2023-06-09 PROCEDURE — 11000001 HC ACUTE MED/SURG PRIVATE ROOM

## 2023-06-09 PROCEDURE — 37000008 HC ANESTHESIA 1ST 15 MINUTES: Performed by: SURGERY

## 2023-06-09 PROCEDURE — 71000033 HC RECOVERY, INTIAL HOUR: Performed by: SURGERY

## 2023-06-09 PROCEDURE — 37000009 HC ANESTHESIA EA ADD 15 MINS: Performed by: SURGERY

## 2023-06-09 PROCEDURE — 43279 PR LAP, ESOPHAGOMYOTOMY W FUNDOPLASTY: ICD-10-PCS | Mod: ,,, | Performed by: SURGERY

## 2023-06-09 PROCEDURE — A4216 STERILE WATER/SALINE, 10 ML: HCPCS | Performed by: SURGERY

## 2023-06-09 PROCEDURE — 36620 ARTERIAL: ICD-10-PCS | Mod: 59,,, | Performed by: ANESTHESIOLOGY

## 2023-06-09 PROCEDURE — 99900031 HC PATIENT EDUCATION (STAT)

## 2023-06-09 PROCEDURE — 36000713 HC OR TIME LEV V EA ADD 15 MIN: Performed by: SURGERY

## 2023-06-09 PROCEDURE — D9220A PRA ANESTHESIA: ICD-10-PCS | Mod: ,,, | Performed by: NURSE ANESTHETIST, CERTIFIED REGISTERED

## 2023-06-09 PROCEDURE — 63600175 PHARM REV CODE 636 W HCPCS: Performed by: SURGERY

## 2023-06-09 PROCEDURE — 25000003 PHARM REV CODE 250: Performed by: NURSE ANESTHETIST, CERTIFIED REGISTERED

## 2023-06-09 PROCEDURE — 94799 UNLISTED PULMONARY SVC/PX: CPT

## 2023-06-09 PROCEDURE — 25000003 PHARM REV CODE 250: Performed by: SURGERY

## 2023-06-09 PROCEDURE — 25000003 PHARM REV CODE 250: Performed by: ANESTHESIOLOGY

## 2023-06-09 PROCEDURE — 36620 INSERTION CATHETER ARTERY: CPT | Mod: 59,,, | Performed by: ANESTHESIOLOGY

## 2023-06-09 PROCEDURE — 27201423 OPTIME MED/SURG SUP & DEVICES STERILE SUPPLY: Performed by: SURGERY

## 2023-06-09 PROCEDURE — 36000712 HC OR TIME LEV V 1ST 15 MIN: Performed by: SURGERY

## 2023-06-09 PROCEDURE — 71000039 HC RECOVERY, EACH ADD'L HOUR: Performed by: SURGERY

## 2023-06-09 PROCEDURE — D9220A PRA ANESTHESIA: Mod: ,,, | Performed by: NURSE ANESTHETIST, CERTIFIED REGISTERED

## 2023-06-09 RX ORDER — ONDANSETRON 4 MG/1
TABLET, ORALLY DISINTEGRATING ORAL
Status: DISPENSED
Start: 2023-06-09 | End: 2023-06-09

## 2023-06-09 RX ORDER — SODIUM CITRATE AND CITRIC ACID MONOHYDRATE 334; 500 MG/5ML; MG/5ML
SOLUTION ORAL
Status: DISPENSED
Start: 2023-06-09 | End: 2023-06-09

## 2023-06-09 RX ORDER — PROPOFOL 10 MG/ML
VIAL (ML) INTRAVENOUS
Status: DISCONTINUED | OUTPATIENT
Start: 2023-06-09 | End: 2023-06-09

## 2023-06-09 RX ORDER — SODIUM CITRATE AND CITRIC ACID MONOHYDRATE 334; 500 MG/5ML; MG/5ML
30 SOLUTION ORAL ONCE
Status: COMPLETED | OUTPATIENT
Start: 2023-06-09 | End: 2023-06-09

## 2023-06-09 RX ORDER — NALOXONE HCL 0.4 MG/ML
0.02 VIAL (ML) INJECTION
Status: DISCONTINUED | OUTPATIENT
Start: 2023-06-09 | End: 2023-06-10 | Stop reason: HOSPADM

## 2023-06-09 RX ORDER — MIDAZOLAM HYDROCHLORIDE 1 MG/ML
2 INJECTION INTRAMUSCULAR; INTRAVENOUS ONCE AS NEEDED
Status: CANCELLED | OUTPATIENT
Start: 2023-06-09 | End: 2034-11-04

## 2023-06-09 RX ORDER — GLYCOPYRROLATE 0.2 MG/ML
INJECTION INTRAMUSCULAR; INTRAVENOUS
Status: DISCONTINUED | OUTPATIENT
Start: 2023-06-09 | End: 2023-06-09

## 2023-06-09 RX ORDER — SODIUM CHLORIDE, SODIUM GLUCONATE, SODIUM ACETATE, POTASSIUM CHLORIDE AND MAGNESIUM CHLORIDE 30; 37; 368; 526; 502 MG/100ML; MG/100ML; MG/100ML; MG/100ML; MG/100ML
INJECTION, SOLUTION INTRAVENOUS CONTINUOUS
Status: CANCELLED | OUTPATIENT
Start: 2023-06-09 | End: 2023-07-09

## 2023-06-09 RX ORDER — SUCCINYLCHOLINE CHLORIDE 20 MG/ML
INJECTION INTRAMUSCULAR; INTRAVENOUS
Status: DISCONTINUED | OUTPATIENT
Start: 2023-06-09 | End: 2023-06-09

## 2023-06-09 RX ORDER — ROCURONIUM BROMIDE 10 MG/ML
INJECTION, SOLUTION INTRAVENOUS
Status: DISCONTINUED | OUTPATIENT
Start: 2023-06-09 | End: 2023-06-09

## 2023-06-09 RX ORDER — ONDANSETRON 4 MG/1
4 TABLET, ORALLY DISINTEGRATING ORAL ONCE
Status: CANCELLED | OUTPATIENT
Start: 2023-06-09 | End: 2023-06-09

## 2023-06-09 RX ORDER — HYDROMORPHONE HYDROCHLORIDE 2 MG/ML
1 INJECTION, SOLUTION INTRAMUSCULAR; INTRAVENOUS; SUBCUTANEOUS
Status: DISCONTINUED | OUTPATIENT
Start: 2023-06-09 | End: 2023-06-10 | Stop reason: HOSPADM

## 2023-06-09 RX ORDER — DEXAMETHASONE SODIUM PHOSPHATE 4 MG/ML
INJECTION, SOLUTION INTRA-ARTICULAR; INTRALESIONAL; INTRAMUSCULAR; INTRAVENOUS; SOFT TISSUE
Status: DISCONTINUED | OUTPATIENT
Start: 2023-06-09 | End: 2023-06-09

## 2023-06-09 RX ORDER — HYDROMORPHONE HYDROCHLORIDE 2 MG/ML
INJECTION, SOLUTION INTRAMUSCULAR; INTRAVENOUS; SUBCUTANEOUS
Status: DISCONTINUED | OUTPATIENT
Start: 2023-06-09 | End: 2023-06-09

## 2023-06-09 RX ORDER — ONDANSETRON 2 MG/ML
INJECTION INTRAMUSCULAR; INTRAVENOUS
Status: DISCONTINUED | OUTPATIENT
Start: 2023-06-09 | End: 2023-06-09

## 2023-06-09 RX ORDER — LIDOCAINE HYDROCHLORIDE 20 MG/ML
INJECTION, SOLUTION EPIDURAL; INFILTRATION; INTRACAUDAL; PERINEURAL
Status: DISCONTINUED | OUTPATIENT
Start: 2023-06-09 | End: 2023-06-09

## 2023-06-09 RX ORDER — ONDANSETRON 2 MG/ML
8 INJECTION INTRAMUSCULAR; INTRAVENOUS EVERY 8 HOURS PRN
Status: DISCONTINUED | OUTPATIENT
Start: 2023-06-09 | End: 2023-06-10 | Stop reason: HOSPADM

## 2023-06-09 RX ORDER — ONDANSETRON 2 MG/ML
4 INJECTION INTRAMUSCULAR; INTRAVENOUS DAILY PRN
Status: DISCONTINUED | OUTPATIENT
Start: 2023-06-09 | End: 2023-06-09 | Stop reason: HOSPADM

## 2023-06-09 RX ORDER — TRAMADOL HYDROCHLORIDE 50 MG/1
50 TABLET ORAL EVERY 6 HOURS PRN
Status: DISCONTINUED | OUTPATIENT
Start: 2023-06-09 | End: 2023-06-10 | Stop reason: HOSPADM

## 2023-06-09 RX ORDER — METOCLOPRAMIDE HYDROCHLORIDE 5 MG/ML
10 INJECTION INTRAMUSCULAR; INTRAVENOUS EVERY 10 MIN PRN
Status: DISCONTINUED | OUTPATIENT
Start: 2023-06-09 | End: 2023-06-09 | Stop reason: HOSPADM

## 2023-06-09 RX ORDER — SODIUM CHLORIDE, SODIUM LACTATE, POTASSIUM CHLORIDE, CALCIUM CHLORIDE 600; 310; 30; 20 MG/100ML; MG/100ML; MG/100ML; MG/100ML
INJECTION, SOLUTION INTRAVENOUS CONTINUOUS
Status: DISCONTINUED | OUTPATIENT
Start: 2023-06-09 | End: 2023-06-10 | Stop reason: HOSPADM

## 2023-06-09 RX ORDER — FAMOTIDINE 10 MG/ML
40 INJECTION INTRAVENOUS ONCE
Status: COMPLETED | OUTPATIENT
Start: 2023-06-09 | End: 2023-06-09

## 2023-06-09 RX ORDER — ONDANSETRON 4 MG/1
4 TABLET, ORALLY DISINTEGRATING ORAL ONCE
Status: COMPLETED | OUTPATIENT
Start: 2023-06-09 | End: 2023-06-09

## 2023-06-09 RX ORDER — SODIUM CITRATE AND CITRIC ACID MONOHYDRATE 334; 500 MG/5ML; MG/5ML
30 SOLUTION ORAL ONCE
Status: CANCELLED | OUTPATIENT
Start: 2023-06-09 | End: 2023-06-09

## 2023-06-09 RX ORDER — FAMOTIDINE 10 MG/ML
INJECTION INTRAVENOUS
Status: DISPENSED
Start: 2023-06-09 | End: 2023-06-09

## 2023-06-09 RX ORDER — PROMETHAZINE HYDROCHLORIDE 25 MG/1
25 TABLET ORAL EVERY 6 HOURS PRN
Status: DISCONTINUED | OUTPATIENT
Start: 2023-06-09 | End: 2023-06-10 | Stop reason: HOSPADM

## 2023-06-09 RX ORDER — ALVIMOPAN 12 MG/1
12 CAPSULE ORAL ONCE
Status: COMPLETED | OUTPATIENT
Start: 2023-06-09 | End: 2023-06-09

## 2023-06-09 RX ORDER — LIDOCAINE HYDROCHLORIDE 10 MG/ML
1 INJECTION, SOLUTION EPIDURAL; INFILTRATION; INTRACAUDAL; PERINEURAL ONCE
Status: CANCELLED | OUTPATIENT
Start: 2023-06-09 | End: 2023-06-09

## 2023-06-09 RX ORDER — MIDAZOLAM HYDROCHLORIDE 1 MG/ML
INJECTION INTRAMUSCULAR; INTRAVENOUS
Status: DISCONTINUED | OUTPATIENT
Start: 2023-06-09 | End: 2023-06-09

## 2023-06-09 RX ORDER — ENOXAPARIN SODIUM 100 MG/ML
30 INJECTION SUBCUTANEOUS EVERY 24 HOURS
Status: DISCONTINUED | OUTPATIENT
Start: 2023-06-09 | End: 2023-06-09 | Stop reason: HOSPADM

## 2023-06-09 RX ORDER — BUPIVACAINE HYDROCHLORIDE 5 MG/ML
INJECTION, SOLUTION EPIDURAL; INTRACAUDAL
Status: DISCONTINUED | OUTPATIENT
Start: 2023-06-09 | End: 2023-06-09 | Stop reason: HOSPADM

## 2023-06-09 RX ORDER — FENTANYL CITRATE 50 UG/ML
INJECTION, SOLUTION INTRAMUSCULAR; INTRAVENOUS
Status: DISCONTINUED | OUTPATIENT
Start: 2023-06-09 | End: 2023-06-09

## 2023-06-09 RX ORDER — HYDROMORPHONE HYDROCHLORIDE 2 MG/ML
1 INJECTION, SOLUTION INTRAMUSCULAR; INTRAVENOUS; SUBCUTANEOUS EVERY 4 HOURS PRN
Status: DISCONTINUED | OUTPATIENT
Start: 2023-06-09 | End: 2023-06-10 | Stop reason: HOSPADM

## 2023-06-09 RX ORDER — SODIUM CHLORIDE 0.9 % (FLUSH) 0.9 %
SYRINGE (ML) INJECTION
Status: DISCONTINUED | OUTPATIENT
Start: 2023-06-09 | End: 2023-06-09 | Stop reason: HOSPADM

## 2023-06-09 RX ADMIN — HYDROMORPHONE HYDROCHLORIDE 0.5 MG: 2 INJECTION, SOLUTION INTRAMUSCULAR; INTRAVENOUS; SUBCUTANEOUS at 10:06

## 2023-06-09 RX ADMIN — LIDOCAINE HYDROCHLORIDE 4 ML: 20 INJECTION, SOLUTION EPIDURAL; INFILTRATION; INTRACAUDAL; PERINEURAL at 09:06

## 2023-06-09 RX ADMIN — SODIUM CHLORIDE, SODIUM GLUCONATE, SODIUM ACETATE, POTASSIUM CHLORIDE AND MAGNESIUM CHLORIDE: 526; 502; 368; 37; 30 INJECTION, SOLUTION INTRAVENOUS at 09:06

## 2023-06-09 RX ADMIN — ALVIMOPAN 12 MG: 12 CAPSULE ORAL at 07:06

## 2023-06-09 RX ADMIN — DEXAMETHASONE SODIUM PHOSPHATE 8 MG: 4 INJECTION, SOLUTION INTRA-ARTICULAR; INTRALESIONAL; INTRAMUSCULAR; INTRAVENOUS; SOFT TISSUE at 10:06

## 2023-06-09 RX ADMIN — ONDANSETRON 4 MG: 4 TABLET, ORALLY DISINTEGRATING ORAL at 07:06

## 2023-06-09 RX ADMIN — HYDROMORPHONE HYDROCHLORIDE 0.5 MG: 2 INJECTION, SOLUTION INTRAMUSCULAR; INTRAVENOUS; SUBCUTANEOUS at 11:06

## 2023-06-09 RX ADMIN — GLYCOPYRROLATE 0.2 MG: 0.2 INJECTION INTRAMUSCULAR; INTRAVENOUS at 09:06

## 2023-06-09 RX ADMIN — FENTANYL CITRATE 100 MCG: 50 INJECTION, SOLUTION INTRAMUSCULAR; INTRAVENOUS at 09:06

## 2023-06-09 RX ADMIN — FAMOTIDINE 40 MG: 10 INJECTION, SOLUTION INTRAVENOUS at 07:06

## 2023-06-09 RX ADMIN — SODIUM CITRATE AND CITRIC ACID MONOHYDRATE 30 ML: 500; 334 SOLUTION ORAL at 07:06

## 2023-06-09 RX ADMIN — MIDAZOLAM HYDROCHLORIDE 2 MG: 1 INJECTION, SOLUTION INTRAMUSCULAR; INTRAVENOUS at 09:06

## 2023-06-09 RX ADMIN — ROCURONIUM BROMIDE 50 MG: 10 SOLUTION INTRAVENOUS at 10:06

## 2023-06-09 RX ADMIN — ENOXAPARIN SODIUM 30 MG: 30 INJECTION SUBCUTANEOUS at 07:06

## 2023-06-09 RX ADMIN — ROCURONIUM BROMIDE 10 MG: 10 SOLUTION INTRAVENOUS at 11:06

## 2023-06-09 RX ADMIN — PROPOFOL 50 MG: 10 INJECTION, EMULSION INTRAVENOUS at 10:06

## 2023-06-09 RX ADMIN — SUCCINYLCHOLINE CHLORIDE 120 MG: 20 INJECTION, SOLUTION INTRAMUSCULAR; INTRAVENOUS at 09:06

## 2023-06-09 RX ADMIN — PROPOFOL 150 MG: 10 INJECTION, EMULSION INTRAVENOUS at 09:06

## 2023-06-09 RX ADMIN — ONDANSETRON 4 MG: 2 INJECTION INTRAMUSCULAR; INTRAVENOUS at 11:06

## 2023-06-09 RX ADMIN — SUGAMMADEX 200 MG: 100 INJECTION, SOLUTION INTRAVENOUS at 11:06

## 2023-06-09 NOTE — ANESTHESIA RELEASE NOTE
Anesthesia Release from PACU Note    Patient: Amber Gomez    Procedure(s) Performed: Procedure(s) (LRB):  XI ROBOTIC MYOTOMY, HELLER (N/A)  EGD (ESOPHAGOGASTRODUODENOSCOPY) (N/A)    Anesthesia type: general    Post pain: Adequate analgesia    Post assessment: no apparent anesthetic complications    Last Vitals:   Visit Vitals  /75   Pulse 95   Temp 36.2 °C (97.2 °F) (Axillary)   Resp 15   Ht 5' (1.524 m)   Wt 66.2 kg (145 lb 15.1 oz)   SpO2 (!) 92%   Breastfeeding No   BMI 28.50 kg/m²       Post vital signs: stable    Level of consciousness: awake    Nausea/Vomiting: no nausea/no vomiting    Complications: none    Airway Patency: patent    Respiratory: unassisted, spontaneous ventilation, room air    Cardiovascular: stable and blood pressure at baseline    Hydration: euvolemic

## 2023-06-09 NOTE — PROGRESS NOTES
Pt Hx and procedure discussed in detail. Post op orders reviewed. Pt attached to v/s machine and vitals WNL. Procedure site, Guerrier, Ivs, and ART Line removal sites all assessed and intact. Everyone understands pt info with no further questions.

## 2023-06-09 NOTE — ANESTHESIA PREPROCEDURE EVALUATION
06/09/2023  Amber Gomez is a 67 y.o., female who presents with achalasia.  Diagnosis:   Achalasia [K22.0]      The pt. comes to [facility] for the noted procedure under GETA with RSi.( Request per Surgeon for Double Lumen Tube)  Procedures:        XI ROBOTIC MYOTOMY, HELLER (Chest) - POSSIBLE THORACIC APPROACH //  XX       EGD (ESOPHAGOGASTRODUODENOSCOPY) (Abdomen)      PMHx:  Other Medical History   Cancer of left kidney Fibroid tumor   Diverticulitis Osteoporosis   Osteoarthritis of both hips High cholesterol   S/P ASHOK (total abdominal hysterectomy) Dysphagia   Disease of gallbladder, unspecified Achalasia   Sleep apnea Tinnitus, left   Epigastric fullness Early satiety     Surgical History:  SINUS SURGERY PARTIAL HYSTERECTOMY   KNEE SURGERY SHOULDER SURGERY   BACK SURGERY ROTATOR CUFF REPAIR   CHOLECYSTECTOMY CARPAL TUNNEL RELEASE   ESOPHAGOGASTRODUODENOSCOPY COLONOSCOPY   TONSILLECTOMY ESOPHAGEAL MANOMETRY WITH MEASUREMENT OF IMPEDANCE   ESOPHAGOGASTRODUODENOSCOPY ELBOW SURGERY   FOOT SURGERY Arm surgery   ESOPHAGOGASTRODUODENOSCOPY EGD, WITH BALLOON DILATION   ADENOIDECTOMY SPINE SURGERY           Vital signs:  Pre Vitals     Current as of 06/09/23 0720  BP: 142/80 Pulse: 76   Resp: 17 SpO2: 96   Temp: 36.9 °C (98.4 °F)   Height: 5' (1.524 m) (06/06/23) Weight: 66.2 kg (146 lb) (06/06/23)   BMI: 28.5 IBW: 45.5 kg (100 lb 4.9 oz)   Last edited 06/09/23 0611 by LACEY          Lab Data:      EKG:      Pre-op Assessment    I have reviewed the Patient Summary Reports.     I have reviewed the Nursing Notes. I have reviewed the NPO Status.   I have reviewed the Medications.     Review of Systems  Anesthesia Hx:  No problems with previous Anesthesia    Social:  Non-Smoker    Hematology/Oncology:  Hematology Normal   Oncology Normal     EENT/Dental:EENT/Dental Normal   Cardiovascular:   Exercise tolerance: good  Hypertension  Functional Capacity good / => 4 METS    Pulmonary:   Sleep Apnea    Renal/:   Chronic Renal Disease    Hepatic/GI:   GERD    Musculoskeletal:   Arthritis     Neurological:   Neuromuscular Disease,    Endocrine:   Hyperthyroidism    Dermatological:  Skin Normal    Psych:  Psychiatric Normal           Physical Exam  General: Alert, Oriented, Well nourished and Cooperative    Airway:  Mallampati: II   Mouth Opening: Normal  TM Distance: Normal  Tongue: Normal  Neck ROM: Normal ROM    Dental:  Intact    Chest/Lungs:  Clear to auscultation, Normal Respiratory Rate    Heart:  Rate: Normal  Rhythm: Regular Rhythm        Anesthesia Plan  Type of Anesthesia, risks & benefits discussed:    Anesthesia Type: Gen ETT  Intra-op Monitoring Plan: Standard ASA Monitors  Post Op Pain Control Plan: IV/PO Opioids PRN  Induction:  IV and Inhalation  Airway Plan: Direct  Informed Consent: Informed consent signed with the Patient and all parties understand the risks and agree with anesthesia plan.  All questions answered. Patient consented to blood products? Yes  ASA Score: 3  Day of Surgery Review of History & Physical: H&P Update referred to the surgeon/provider.    Ready For Surgery From Anesthesia Perspective.     .

## 2023-06-09 NOTE — OP NOTE
Date of Surgery:  6/9/23    Surgeon:  Murali Cardona MD    Assistant:  None                                        Pre-op Diagnosis:  Achalasia    Post-op Diagnosis:  Same    Procedure:    1. Laparoscopic/robotic Heller myotomy  2. Laparoscopic/robotic partial anterior fundoplication   3. Intraoperative EGD    Anesthesia:  GETA    EBL:  Less than 25 cc    Specimens:  None    Findings:  Extended Heller myotomy was performed extending 8-10cm  above the GE junction and 3-4 cm onto the gastric cardia.  The myotomy was somewhat more tedious and difficult than rafael due to multiple previous Botox injections, but There was no evidence of entry into the esophageal lumen.  Completion EGD revealed no evidence of esophageal luminal compromise and adequate patency of the GE junction.    Procedure in detail:  After informed consent was obtained the patient was brought to the operating room placed supine position.  General endotracheal anesthesia administered difficulty.  The patient's abdomen was prepped and draped in sterile fashion.  Supraumbilical incision was made optical trocar used into the peritoneal cavity under direct vision.  Pneumoperitoneum was achieved without difficulty.  Additional ports were placed under direct vision.  A liver retractor was placed beneath the left lateral segment of the liver.  The robot was docked.  The lesser omentum was incised and the right crura was identified.  Dissection was begun along the anterior medial edge of the right crura dissecting the anterior esophagus with careful attention to preserve the left vagus nerve.  Dissection was carried out into the lower mediastinum anteriorly and then distally.  The vagus nerve was dissected circumferentially as it crossed over the GE junction onto the lesser curvature of the stomach.  Myotomy was then begun dividing the longitudinal muscles and exposing the circular muscle.  The circular muscles were meticulously divided sharply exposing the  underlying submucosa.  The avascular plane over the submucosa was then gently developed bluntly proximally and distally. This was made more difficult by the patient's multiple previous botox injections. A long myotomy was performed and the muscles were completely divided 8-10 cm above the GE junction and then extending 3-4 cm onto the gastric cardia.  Complete division of the circular muscle layers was performed.  The edges of the myotomy were undermined for a short distance to allow the submucosa to bulge through more completely.  There was no evidence of entry into the esophageal lumen on careful examination.  The uppermost short gastric vessels were then divided using the vessel sealer.  The fundus was then brought over anteriorly as a partial fundoplication and attached with 2-0 silk sutures to the right edge of the myotomy and right crura.  I then performed intraoperative EGD passing the gastroscope into the esophagus under direct vision and gently passing it into the stomach.  The scope was retroflexed. Minimal insufflation was used and the scope was retracted visualizing the GE junction which was of adequate patency there was no evidence of entry into the esophageal lumen.  The insufflation was suctioned out and the scope was removed under direct vision no other abnormalities were seen.  The abdomen was inspected for hemostasis which appeared to be excellent.  Ports were removed, pneumoperitoneum was relieved, port sites were closed with absorbable suture sterile dressings were applied.  The patient tolerated the procedure well.      Murali Cardona MD  Surgical Oncology  Complex General, Gastrointestinal and Hepatobiliary Surgery

## 2023-06-09 NOTE — TRANSFER OF CARE
Anesthesia Transfer of Care Note    Patient: Amber Gomez    Procedure(s) Performed: Procedure(s) (LRB):  XI ROBOTIC MYOTOMY, HELLER (N/A)  EGD (ESOPHAGOGASTRODUODENOSCOPY) (N/A)    Patient location: PACU    Anesthesia Type: general    Transport from OR: Transported from OR on room air with adequate spontaneous ventilation    Post pain: adequate analgesia    Post assessment: no apparent anesthetic complications    Post vital signs: stable    Level of consciousness: awake, alert and oriented    Nausea/Vomiting: no nausea/vomiting    Complications: none    Transfer of care protocol was followed      Last vitals:   Visit Vitals  BP (!) 145/85 (BP Location: Right arm, Patient Position: Lying)   Pulse 78   Temp 36.2 °C (97.2 °F) (Axillary)   Resp 19   Ht 5' (1.524 m)   Wt 66.2 kg (145 lb 15.1 oz)   SpO2 98%   Breastfeeding No   BMI 28.50 kg/m²

## 2023-06-09 NOTE — ANESTHESIA PROCEDURE NOTES
Arterial    Diagnosis: Achalasia    Patient location during procedure: holding area  Procedure start time: 6/9/2023 10:05 AM  Timeout: 6/9/2023 10:00 AM  Procedure end time: 6/9/2023 10:15 AM    Staffing  Authorizing Provider: Dawood Gonzalez MD  Performing Provider: Dawood Gonzalez MD    Anesthesiologist was present at the time of the procedure.    Preanesthetic Checklist  Completed: patient identified, IV checked, site marked, risks and benefits discussed, surgical consent, monitors and equipment checked, pre-op evaluation, timeout performed and anesthesia consent givenArterial  Skin Prep: chlorhexidine gluconate and isopropyl alcohol  Local Infiltration: lidocaine  Location: radial    Catheter Size: 20 G  Catheter placement by Ultrasound guidance and Anatomical landmarks. Heme positive aspiration all ports.   Vessel Caliber: small, patent, compressibility normal  Vascular Doppler:  not done  Needle advanced into vessel with real time Ultrasound guidance.  Guidewire confirmed in vessel.  Sterile sheath used.  Image recorded and saved.Insertion Attempts: 4 or more  Assessment  Dressing: secured with tape and tegaderm  Patient: Tolerated well  Additional Notes  Several unsuccessful attempts on both left and right radial   Success attained per Right radial.

## 2023-06-09 NOTE — NURSING
Nurses Note -- 4 Eyes      6/9/2023   3:09 PM      Skin assessed during: Admit      [x] No Altered Skin Integrity Present    []Prevention Measures Documented      [] Yes- Altered Skin Integrity Present or Discovered   [] LDA Added if Not in Epic (Describe Wound)   [] New Altered Skin Integrity was Present on Admit and Documented in LDA   [] Wound Image Taken    Wound Care Consulted? No    Attending Nurse:  Natasha Greene RN     Second RN/Staff Member:  Gonzalez Sharpe LPN    Lap sites from sx present

## 2023-06-09 NOTE — ANESTHESIA PROCEDURE NOTES
Intubation    Date/Time: 6/9/2023 10:00 AM  Performed by: Steve Mendoza CRNA  Authorized by: Dawood Gonzalez MD     Intubation:     Induction:  Intravenous    Intubated:  Postinduction    Mask Ventilation:  Easy with oral airway    Attempts:  2    Attempted By:  CRNA    Method of Intubation:  Fiberoptic and direct    Blade:  Baeza 3    Laryngeal View Grade: Grade IIA - cords partially seen      Blade (2nd Attempt):  Baeza 3    Laryngeal View Grade (2nd Attempt): Grade IIa - cords partially seen      Difficult Airway Encountered?: No      Airway Device:  Double lumen tube left    Airway Device Size:  Other (see comments)    Style/Cuff Inflation:  Cuffed (inflated to minimal occlusive pressure)    Inflation Amount (mL):  6    Tube secured:  25    Secured at:  The teeth    Placement Verified By:  Fiber optic visualization and Capnometry    Findings Post-Intubation:  BS equal bilateral and atraumatic/condition of teeth unchanged  Notes:      First pass attempt with 35 RASHID, could not advance. 2nd pass with 32fr, advanced easily, no issues

## 2023-06-09 NOTE — CONSULTS
Nutrition Education    Education Provided: post-Nissen diet  Teaching Method: explanation and printed materials  Comprehension: verbalizes understanding  Barriers to Learning: none evident  Expected Compliance: good  Comments: All questions were answered and dietitian's contact information was provided.

## 2023-06-09 NOTE — ANESTHESIA PROCEDURE NOTES
Arterial    Diagnosis: Severe achalasia    Patient location during procedure: done in OR  Procedure start time: 6/9/2023 10:16 AM  Timeout: 6/9/2023 10:15 AM  Procedure end time: 6/9/2023 10:17 AM    Staffing  Authorizing Provider: Dawood Gonzalez MD  Performing Provider: Steve Mendoza CRNA    Anesthesiologist was present at the time of the procedure.    Preanesthetic Checklist  Completed: patient identified, IV checked, site marked, risks and benefits discussed, surgical consent, monitors and equipment checked, pre-op evaluation, timeout performed and anesthesia consent givenArterial  Skin Prep: chlorhexidine gluconate and isopropyl alcohol  Local Infiltration: lidocaine  Orientation: right  Location: radial    Catheter Size: 20 G  Catheter placement by Ultrasound guidance. Heme positive aspiration all ports.   Vessel Caliber: small, patent, compressibility normal  Vascular Doppler:  not done  Needle advanced into vessel with real time Ultrasound guidance.  Guidewire confirmed in vessel.  Sterile sheath used.  Image recorded and saved.Insertion Attempts: 3  Assessment  Dressing: secured with tape and tegaderm  Patient: Tolerated well

## 2023-06-09 NOTE — TRANSFER OF CARE
Anesthesia Transfer of Care Note    Patient: Amber Gomez    Procedure(s) Performed: Procedure(s) (LRB):  XI ROBOTIC MYOTOMY, HELLER (N/A)  EGD (ESOPHAGOGASTRODUODENOSCOPY) (N/A)    Anesthesia PACU Handoff    Last vitals:   Visit Vitals  BP (!) 145/85 (BP Location: Right arm, Patient Position: Lying)   Pulse 78   Temp 36.2 °C (97.2 °F) (Axillary)   Resp 19   Ht 5' (1.524 m)   Wt 66.2 kg (145 lb 15.1 oz)   SpO2 98%   Breastfeeding No   BMI 28.50 kg/m²

## 2023-06-09 NOTE — ANESTHESIA POSTPROCEDURE EVALUATION
Anesthesia Post Evaluation    Patient: Amber Gomez    Procedure(s) Performed: Procedure(s) (LRB):  XI ROBOTIC MYOTOMY, HELLER (N/A)  EGD (ESOPHAGOGASTRODUODENOSCOPY) (N/A)    Final Anesthesia Type: general      Patient location during evaluation: PACU  Patient participation: Yes- Able to Participate  Level of consciousness: awake and alert  Post-procedure vital signs: reviewed and stable  Pain management: adequate  Airway patency: patent  WALKER mitigation strategies: Multimodal analgesia  PONV status at discharge: No PONV  Anesthetic complications: no      Cardiovascular status: blood pressure returned to baseline and hemodynamically stable  Respiratory status: unassisted and spontaneous ventilation  Hydration status: euvolemic  Follow-up not needed.          Vitals Value Taken Time   /75 06/09/23 1658   Temp 36.2 °C (97.2 °F) 06/09/23 1358   Pulse 95 06/09/23 1658   Resp 15 06/09/23 1348   SpO2 92 % 06/09/23 1658   Vitals shown include unvalidated device data.      Event Time   Out of Recovery 13:45:06         Pain/Adal Score: Adal Score: 10 (6/9/2023  1:45 PM)

## 2023-06-10 VITALS
BODY MASS INDEX: 28.65 KG/M2 | HEIGHT: 60 IN | WEIGHT: 145.94 LBS | TEMPERATURE: 98 F | OXYGEN SATURATION: 96 % | RESPIRATION RATE: 18 BRPM | DIASTOLIC BLOOD PRESSURE: 64 MMHG | HEART RATE: 80 BPM | SYSTOLIC BLOOD PRESSURE: 124 MMHG

## 2023-06-10 PROCEDURE — 94761 N-INVAS EAR/PLS OXIMETRY MLT: CPT

## 2023-06-12 ENCOUNTER — PATIENT OUTREACH (OUTPATIENT)
Dept: ADMINISTRATIVE | Facility: CLINIC | Age: 68
End: 2023-06-12
Payer: MEDICARE

## 2023-06-12 NOTE — PROGRESS NOTES
C3 nurse spoke with Amber Gomez  for a TCC post hospital discharge follow up call. The patient does not have a scheduled HOSFU appointment with Aurora Christie MD  within 5-7 days post hospital discharge date 6/10/23. C3 nurse was unable to schedule HOSFU appointment in [facility].    Message sent to PCP staff requesting they contact patient and schedule follow up appointment.

## 2023-06-13 NOTE — DISCHARGE SUMMARY
[facility] - 8th Floor Med Surg  General Surgery  Discharge Summary      Patient Name: Amber Gomez  MRN: [id number]  Admission Date: 6/9/2023  Hospital Length of Stay: 1 days  Discharge Date and Time: 6/10/2023 10:58 AM  Attending Physician: No att. providers found   Discharging Provider: TANYA Mccray  Primary Care Provider: Aurora Christie MD    HPI:   No notes on file    Procedure(s) (LRB):  XI ROBOTIC MYOTOMY, HELLER (N/A)  EGD (ESOPHAGOGASTRODUODENOSCOPY) (N/A)      Indwelling Lines/Drains at time of discharge:   Lines/Drains/Airways       Peripherally Inserted Central Catheter Line  Duration             PICC Double Lumen 01/06/23 1132 left basilic 26 days                  Hospital Course: 67 YEAR OLD FEMALE WITH HX ACHALSIA; UNDERWENT LAP/JES HELLER; NO POST OPERATIVE COMPLICATIONS; STABLE ON DISCHARGE    Goals of Care Treatment Preferences:         Consults:   Consults (From admission, onward)          Status Ordering Provider     Inpatient consult to Registered Dietitian/Nutritionist  Once        Provider:  (Not yet assigned)    Completed SUSHANT CLARK            Significant Diagnostic Studies: N/A      Pending Diagnostic Studies:       None          Final Active Diagnoses:    Diagnosis Date Noted POA    PRINCIPAL PROBLEM:  Esophageal obstruction [K22.2] 03/22/2023 Yes      Problems Resolved During this Admission:      Discharged Condition: good    Disposition: Home or Self Care    Follow Up:   Follow-up Information       Murali Cardona MD Follow up in 2 week(s).    Specialty: Surgical Oncology  Why: post op f/u  Contact information:  [address] [place] [id number]  [phone]                           Patient Instructions:   No discharge procedures on file.  Medications:  Reconciled Home Medications:      Medication List        ASK your doctor about these medications      benzonatate 200 MG capsule  Commonly known as: TESSALON  Take 1 capsule (200 mg total) by mouth 3  (three) times daily as needed for Cough.     calcium-vitamin D 600 mg-10 mcg (400 unit) Tab  Take 2 tablets by mouth once daily.     celecoxib 200 MG capsule  Commonly known as: CeleBREX  Take 1 capsule (200 mg total) by mouth once daily.     diltiaZEM 30 MG tablet  Commonly known as: CARDIZEM  Take 1 tablet (30 mg total) by mouth every 6 (six) hours.     DULoxetine 60 MG capsule  Commonly known as: CYMBALTA  Take 60 mg by mouth once daily.     estradioL 1 MG tablet  Commonly known as: ESTRACE  Take 1 tablet (1 mg total) by mouth once daily.     ezetimibe 10 mg tablet  Commonly known as: ZETIA  Take 10 mg by mouth every evening.     fenofibrate micronized 67 MG capsule  Commonly known as: LOFIBRA  Take 67 mg by mouth every evening.     hydroCHLOROthiazide 50 MG tablet  Commonly known as: HYDRODIURIL  Take 50 mg by mouth once daily.     montelukast 10 mg tablet  Commonly known as: SINGULAIR  Take 1 tablet (10 mg total) by mouth once daily.     pantoprazole 40 MG tablet  Commonly known as: PROTONIX  Take 1 tablet (40 mg total) by mouth once daily.     potassium chloride 10% 20 mEq/15 mL oral solution  Commonly known as: KAYCIEL  Take 20 mEq by mouth once daily. One tablespoon in the morning and night     simvastatin 40 MG tablet  Commonly known as: ZOCOR  See Instructions, TAKE 1 TABLET BY MOUTH EVERY DAY AT BEDTIME, # 30 tab(s), 11 Refill(s), Pharmacy: [id number], 153, cm, Height/Length Dosing, 08/23/21 11:25:00 CDT, 65.77, kg, Weight Dosing, 08/23/21 11:25:00 CDT            Time spent on the discharge of patient:  minutes    TANYA Mccray  General Surgery  [facility] - 8th Floor Med Surg

## 2023-06-14 ENCOUNTER — OFFICE VISIT (OUTPATIENT)
Dept: INTERNAL MEDICINE | Facility: CLINIC | Age: 68
End: 2023-06-14
Payer: MEDICARE

## 2023-06-14 VITALS
OXYGEN SATURATION: 95 % | HEART RATE: 92 BPM | SYSTOLIC BLOOD PRESSURE: 126 MMHG | BODY MASS INDEX: 28.66 KG/M2 | RESPIRATION RATE: 16 BRPM | DIASTOLIC BLOOD PRESSURE: 80 MMHG | WEIGHT: 146 LBS | HEIGHT: 60 IN

## 2023-06-14 DIAGNOSIS — K22.0 ACHALASIA OF DIGESTIVE TRACT: ICD-10-CM

## 2023-06-14 DIAGNOSIS — R13.19 ESOPHAGEAL DYSPHAGIA: ICD-10-CM

## 2023-06-14 DIAGNOSIS — Z09 HOSPITAL DISCHARGE FOLLOW-UP: Primary | ICD-10-CM

## 2023-06-14 PROCEDURE — 99496 TRANSJ CARE MGMT HIGH F2F 7D: CPT | Mod: ,,, | Performed by: NURSE PRACTITIONER

## 2023-06-14 PROCEDURE — 99496 TRANSITIONAL CARE MANAGE SERVICE 7 DAY DISCHARGE: ICD-10-PCS | Mod: ,,, | Performed by: NURSE PRACTITIONER

## 2023-06-14 NOTE — PROGRESS NOTES
Subjective:      Patient ID: Amber Gomez is a 67 y.o. female.    Chief Complaint: Follow-up (Pt is here for f/u after esophageal surgery and is feeling okay it hurts when she walks a little she will get shaky and cold.)    Family and/or Caretaker present at visit?  No.  Diagnostic tests reviewed/disposition: No diagnosic tests pending after this hospitalization.  Disease/illness education: Yes  Home health/community services discussion/referrals: Patient does not have home health established from hospital visit.  They do not need home health.  If needed, we will set up home health for the patient.   Establishment or re-establishment of referral orders for community resources: No other necessary community resources.   Discussion with other health care providers: No discussion with other health care providers necessary.  I reviewed and reconciled the medications from hospital discharge.    HPI: Patient here today for hospital discharge follow up. Patient has a known small hiatal hernia and a tortuous esophagus dating back to 2012. She did see Dr. Murali Cardona MD with plans to have Heller myotomy. Admitted 6/9 for elective XI Robotic Heller myotomy per Dr. Cardona with d/c home 6/10.  F/u with [facility] on 6/27. Overall, doing very well. On soft diet and roque.  She was instructed by nutritionist to advance as tolerated. Denies dysphagia s/s.     Review of patient's allergies indicates:   Allergen Reactions    Diphenhydramine hcl Other (See Comments)    Hydrocodone-acetaminophen      Other reaction(s): weird feeling       Review of Systems  Constitutional: No fever, No chills, No sweats, No fatigue, slight weight loss.  Eyes: No blurring.  Ear/Nose/Mouth/Throat: No nasal congestion, No vertigo.  Respiratory: No shortness of breath, No cough, No sputum production, No wheezing, No exertional dyspnea.   Cardiovascular: No chest pain, No palpitations  Gastrointestinal: No nausea, No vomiting, No diarrhea, No rectal bleeding, No  constipation, No abdominal pain.  Genitourinary: No dysuria, No hematuria, No frequency.    Objective:   Visit Vitals  /80 (BP Location: Left arm, Patient Position: Sitting, BP Method: Medium (Manual))   Pulse 92   Resp 16   Ht 5' (1.524 m)   Wt 66.2 kg (146 lb)   SpO2 95%   BMI 28.51 kg/m²     The patient's weight trend is below:   Wt Readings from Last 4 Encounters:   06/14/23 66.2 kg (146 lb)   06/09/23 66.2 kg (145 lb 15.1 oz)   05/17/23 68.6 kg (151 lb 3.2 oz)   03/22/23 69.4 kg (153 lb)        Physical Exam  Vitals and nursing note reviewed.   Constitutional:       General: She is not in acute distress.     Appearance: Normal appearance. She is normal weight. She is not ill-appearing, toxic-appearing or diaphoretic.   HENT:      Head: Normocephalic and atraumatic.   Cardiovascular:      Rate and Rhythm: Normal rate and regular rhythm.      Heart sounds: Normal heart sounds.   Pulmonary:      Effort: Pulmonary effort is normal.      Breath sounds: Normal breath sounds.   Abdominal:      General: Abdomen is flat. Bowel sounds are normal.      Palpations: Abdomen is soft.   Skin:     Findings: Bruising present.      Comments: Multiple lap sx incisions with mild erythema/bruising; no d/c   Neurological:      General: No focal deficit present.      Mental Status: She is alert and oriented to person, place, and time. Mental status is at baseline.         Assessment/Plan:   1. Hospital discharge follow-up  Reviewed hosp records and dx studies  Keep scheduled f/u with [facility]    2. Achalasia of digestive tract  The patient is stable on the current regimen.    Continue current medications for this problem.    Follow up with specialist that is also caring for this problem.    3. Esophageal dysphagia  The patient is stable on the current regimen.    Continue current medications for this problem.    Follow up with specialist that is also caring for this problem.       Medication List with Changes/Refills   Current  Medications    BENZONATATE (TESSALON) 200 MG CAPSULE    Take 1 capsule (200 mg total) by mouth 3 (three) times daily as needed for Cough.    CALCIUM-VITAMIN D 600 MG-10 MCG (400 UNIT) TAB    Take 2 tablets by mouth once daily.    CELECOXIB (CELEBREX) 200 MG CAPSULE    Take 1 capsule (200 mg total) by mouth once daily.    DILTIAZEM (CARDIZEM) 30 MG TABLET    Take 1 tablet (30 mg total) by mouth every 6 (six) hours.    DULOXETINE (CYMBALTA) 60 MG CAPSULE    Take 60 mg by mouth once daily.    ESTRADIOL (ESTRACE) 1 MG TABLET    Take 1 tablet (1 mg total) by mouth once daily.    EZETIMIBE (ZETIA) 10 MG TABLET    Take 10 mg by mouth every evening.    FENOFIBRATE MICRONIZED (LOFIBRA) 67 MG CAPSULE    Take 67 mg by mouth every evening.    HYDROCHLOROTHIAZIDE (HYDRODIURIL) 50 MG TABLET    Take 50 mg by mouth once daily.    MONTELUKAST (SINGULAIR) 10 MG TABLET    Take 1 tablet (10 mg total) by mouth once daily.    PANTOPRAZOLE (PROTONIX) 40 MG TABLET    Take 1 tablet (40 mg total) by mouth once daily.    POTASSIUM CHLORIDE 10% (KAYCIEL) 20 MEQ/15 ML ORAL SOLUTION    Take 20 mEq by mouth once daily. One tablespoon in the morning and night    SIMVASTATIN (ZOCOR) 40 MG TABLET      See Instructions, TAKE 1 TABLET BY MOUTH EVERY DAY AT BEDTIME, # 30 tab(s), 11 Refill(s), Pharmacy: [facility] #[id number], 153, cm, Height/Length Dosing, 08/23/21 11:25:00 CDT, 65.77, kg, Weight Dosing, 08/23/21 11:25:00 CDT        No follow-ups on file.    Chemistry:  Lab Results   Component Value Date     06/05/2023    K 4.0 06/05/2023    CHLORIDE 98 06/05/2023    BUN 17.7 06/05/2023    CREATININE 1.08 (H) 06/05/2023    EGFRNORACEVR 57 06/05/2023    GLUCOSE 108 06/05/2023    CALCIUM 9.8 06/05/2023    ALKPHOS 59 06/05/2023    LABPROT 7.2 06/05/2023    ALBUMIN 4.4 06/05/2023    BILIDIR 0.20 12/20/2017    IBILI 0.50 12/20/2017    AST 17 06/05/2023    ALT 18 06/05/2023    MG 2.1 12/20/2017    PHOS 0.3 (L) 12/20/2017        No results found  for: HGBA1C, MICROALBCREA     Hematology:  Lab Results   Component Value Date    WBC 6.71 06/05/2023    HGB 13.8 06/05/2023    HCT 42.6 06/05/2023     06/05/2023       Lipid Panel:  No results found for: CHOL, HDL, LDL, TRIG, TOTALCHOLEST     Urine:  Lab Results   Component Value Date    APPEARANCEUA CLEAR 12/20/2017    PROTEINUA Trace (A) 12/20/2017    LEUKOCYTESUR Negative 12/20/2017    RBCUA 9 (H) 12/20/2017    WBCUA NONE SEEN 12/20/2017    BACTERIA NONE SEEN 12/20/2017

## 2023-06-27 ENCOUNTER — OFFICE VISIT (OUTPATIENT)
Dept: SURGICAL ONCOLOGY | Facility: CLINIC | Age: 68
End: 2023-06-27
Payer: MEDICARE

## 2023-06-27 DIAGNOSIS — K22.0 ACHALASIA: Primary | ICD-10-CM

## 2023-06-27 PROCEDURE — 99024 POSTOP FOLLOW-UP VISIT: CPT | Mod: POP,,, | Performed by: NURSE PRACTITIONER

## 2023-06-27 PROCEDURE — 99024 PR POST-OP FOLLOW-UP VISIT: ICD-10-PCS | Mod: POP,,, | Performed by: NURSE PRACTITIONER

## 2023-06-27 NOTE — PROGRESS NOTES
POST OP LAP/JES HELLER MYOTOMY  HX ACHALASIA    PT TELLS ME SHE IS DOING VERY WELL  VERY HAPPY AS SHE IS ABLE TO EAT AND SWALLOW WITHOUT TROUBLE  SHE HAS BEEN ON SOFT FOODS AND STARTING ADVANCING THEM  NO DIFFICULTY SWALLOWING  NO COMPLAINTS OF PAIN  NO NAUSEA OR VOMITING    ABD SOFT  INCISIONS HEALING WELL  NO SIGNS OF INFECTION    WILL ADVANCE DIET AS TOLERATED  KNOWS TO CALL WITH ANY PROBLEMS  SHE IS VERY APPRECIATIVE AS SHE HAS HAD COMPLETE RESOLUTION OF ACHALASIA SYMPTOMS WHICH SHE TELLS ME IS FIRST TIME SINCE SHE WAS 37 YEARS OLD    RTC PRN

## 2023-09-07 ENCOUNTER — PATIENT MESSAGE (OUTPATIENT)
Dept: RESEARCH | Facility: HOSPITAL | Age: 68
End: 2023-09-07
Payer: MEDICARE

## 2023-09-11 ENCOUNTER — TELEPHONE (OUTPATIENT)
Dept: INTERNAL MEDICINE | Facility: CLINIC | Age: 68
End: 2023-09-11
Payer: MEDICARE

## 2023-09-11 NOTE — PROGRESS NOTES
Subjective:      Patient ID: Amber Gomez is a 67 y.o. female.    Chief Complaint: Neck Pain (Neck pain, headache, nausea/vomiting)      HPI: Patient here today for c/o headaches, neck pain, and nausea/vomiting. She states that s/s progress to generalized joint pain, rib pain, and low back pain. Nausea with vomiting Sat while out of town which resolved. No stomach pain. Pain to L sided of head and into L side of neck x 2 weeks. Generalized back pain is chronic. Rates pain 6-10/10. Inc fatigue yesterday with sleeping all day. Home COVID neg last night on day 4 of s/s.     Review of patient's allergies indicates:   Allergen Reactions    Diphenhydramine hcl Other (See Comments)    Hydrocodone-acetaminophen      Other reaction(s): weird feeling       Review of Systems  Constitutional: No fever, chills, No sweats, fatigue  Ear/Nose/Mouth/Throat: No nasal congestion, No vertigo.  Respiratory: No shortness of breath, No cough, No sputum production, No wheezing, No exertional dyspnea.   Cardiovascular: No chest pain, No palpitations, No claudication, No orthopnea, No peripheral edema.  Gastrointestinal: h/o nausea with vomiting, No diarrhea, No rectal bleeding, No constipation, No abdominal pain.  Genitourinary: No dysuria, No hematuria, No frequency.  Musculoskeletal: Gen joint pain, No muscle pain.  Integumentary: No rash, No ecchymosis.  Neurologic: No altered mental status, L sided headaches.    Objective:   Visit Vitals  /82 (BP Location: Right arm, Patient Position: Sitting, BP Method: Large (Manual))   Pulse 94   Resp 18   Ht 5' (1.524 m)   Wt 66.3 kg (146 lb 1.6 oz)   SpO2 99%   BMI 28.53 kg/m²     The patient's weight trend is below:   Wt Readings from Last 4 Encounters:   09/12/23 66.3 kg (146 lb 1.6 oz)   06/14/23 66.2 kg (146 lb)   06/09/23 66.2 kg (145 lb 15.1 oz)   05/17/23 68.6 kg (151 lb 3.2 oz)        Physical Exam  Vitals and nursing note reviewed.   Constitutional:       General: She is not in acute  distress.     Appearance: Normal appearance. She is normal weight. She is not ill-appearing, toxic-appearing or diaphoretic.   HENT:      Head: Normocephalic and atraumatic.      Right Ear: Tympanic membrane, ear canal and external ear normal.      Left Ear: Tympanic membrane, ear canal and external ear normal.      Nose: Congestion and rhinorrhea present.      Mouth/Throat:      Mouth: Mucous membranes are moist.      Pharynx: Oropharynx is clear. No oropharyngeal exudate or posterior oropharyngeal erythema.   Cardiovascular:      Rate and Rhythm: Normal rate and regular rhythm.      Pulses: Normal pulses.      Heart sounds: Normal heart sounds. No murmur heard.  Pulmonary:      Effort: Pulmonary effort is normal. No respiratory distress.      Breath sounds: Normal breath sounds. No stridor. No wheezing, rhonchi or rales.   Musculoskeletal:         General: Normal range of motion.      Cervical back: Normal range of motion and neck supple. No rigidity or tenderness.   Lymphadenopathy:      Cervical: No cervical adenopathy.   Skin:     General: Skin is warm and dry.   Neurological:      General: No focal deficit present.      Mental Status: She is alert and oriented to person, place, and time. Mental status is at baseline.      Motor: No weakness.   Psychiatric:         Mood and Affect: Mood normal.         Thought Content: Thought content normal.         Judgment: Judgment normal.         Assessment/Plan:   1. Frequent headaches  Work up with CT head and labs.   DD: viral syndrome    - CBC Auto Differential; Future  - Comprehensive Metabolic Panel; Future  - Sedimentation rate; Future  - CRP, High Sensitivity; Future  - CT Head Without Contrast; Future    2. Neck pain  Work up   RX tizanidine    - CBC Auto Differential; Future  - Comprehensive Metabolic Panel; Future  - Sedimentation rate; Future  - CRP, High Sensitivity; Future  - X-Ray Cervical Spine Complete 5 view; Future  - tiZANidine (ZANAFLEX) 2 MG tablet;  Take 2 tablets (4 mg total) by mouth every 8 (eight) hours as needed (muscle spasms).  Dispense: 30 tablet; Refill: 0    3. Nausea and vomiting, unspecified vomiting type  Resolved    - CBC Auto Differential; Future  - Comprehensive Metabolic Panel; Future  - Sedimentation rate; Future  - CRP, High Sensitivity; Future  - CT Head Without Contrast; Future    4. History of back surgery      5. Dizziness and giddiness    - CT Head Without Contrast; Future      Medication List with Changes/Refills   New Medications    TIZANIDINE (ZANAFLEX) 2 MG TABLET    Take 2 tablets (4 mg total) by mouth every 8 (eight) hours as needed (muscle spasms).   Current Medications    BENZONATATE (TESSALON) 200 MG CAPSULE    Take 1 capsule (200 mg total) by mouth 3 (three) times daily as needed for Cough.    CALCIUM-VITAMIN D 600 MG-10 MCG (400 UNIT) TAB    Take 2 tablets by mouth once daily.    CELECOXIB (CELEBREX) 200 MG CAPSULE    Take 1 capsule (200 mg total) by mouth once daily.    DILTIAZEM (CARDIZEM) 30 MG TABLET    Take 1 tablet (30 mg total) by mouth every 6 (six) hours.    DULOXETINE (CYMBALTA) 60 MG CAPSULE    Take 60 mg by mouth once daily.    ESTRADIOL (ESTRACE) 1 MG TABLET    Take 1 tablet (1 mg total) by mouth once daily.    EZETIMIBE (ZETIA) 10 MG TABLET    Take 10 mg by mouth every evening.    FENOFIBRATE MICRONIZED (LOFIBRA) 67 MG CAPSULE    Take 67 mg by mouth every evening.    HYDROCHLOROTHIAZIDE (HYDRODIURIL) 50 MG TABLET    Take 50 mg by mouth once daily.    MONTELUKAST (SINGULAIR) 10 MG TABLET    Take 1 tablet (10 mg total) by mouth once daily.    PANTOPRAZOLE (PROTONIX) 40 MG TABLET    Take 1 tablet (40 mg total) by mouth once daily.    POTASSIUM CHLORIDE 10% (KAYCIEL) 20 MEQ/15 ML ORAL SOLUTION    Take 20 mEq by mouth once daily. One tablespoon in the morning and night    SIMVASTATIN (ZOCOR) 40 MG TABLET      See Instructions, TAKE 1 TABLET BY MOUTH EVERY DAY AT BEDTIME, # 30 tab(s), 11 Refill(s), Pharmacy: [facility]  [id number], 153, cm, Height/Length Dosing, 08/23/21 11:25:00 CDT, 65.77, kg, Weight Dosing, 08/23/21 11:25:00 CDT        No follow-ups on file.    Chemistry:  Lab Results   Component Value Date     06/05/2023    K 4.0 06/05/2023    CHLORIDE 98 06/05/2023    BUN 17.7 06/05/2023    CREATININE 1.08 (H) 06/05/2023    EGFRNORACEVR 57 06/05/2023    GLUCOSE 108 06/05/2023    CALCIUM 9.8 06/05/2023    ALKPHOS 59 06/05/2023    LABPROT 7.2 06/05/2023    ALBUMIN 4.4 06/05/2023    BILIDIR 0.20 12/20/2017    IBILI 0.50 12/20/2017    AST 17 06/05/2023    ALT 18 06/05/2023    MG 2.1 12/20/2017    PHOS 0.3 (L) 12/20/2017        No results found for: "HGBA1C", "MICROALBCREA"     Hematology:  Lab Results   Component Value Date    WBC 6.71 06/05/2023    HGB 13.8 06/05/2023    HCT 42.6 06/05/2023     06/05/2023       Lipid Panel:  No results found for: "CHOL", "HDL", "LDL", "TRIG", "TOTALCHOLEST"     Urine:  Lab Results   Component Value Date    APPEARANCEUA CLEAR 12/20/2017    PROTEINUA Trace (A) 12/20/2017    LEUKOCYTESUR Negative 12/20/2017    RBCUA 9 (H) 12/20/2017    WBCUA NONE SEEN 12/20/2017    BACTERIA NONE SEEN 12/20/2017        "

## 2023-09-12 ENCOUNTER — OFFICE VISIT (OUTPATIENT)
Dept: INTERNAL MEDICINE | Facility: CLINIC | Age: 68
End: 2023-09-12
Payer: MEDICARE

## 2023-09-12 ENCOUNTER — HOSPITAL ENCOUNTER (OUTPATIENT)
Dept: RADIOLOGY | Facility: HOSPITAL | Age: 68
Discharge: HOME | End: 2023-09-12
Attending: NURSE PRACTITIONER
Payer: MEDICARE

## 2023-09-12 VITALS
DIASTOLIC BLOOD PRESSURE: 82 MMHG | BODY MASS INDEX: 28.69 KG/M2 | OXYGEN SATURATION: 99 % | SYSTOLIC BLOOD PRESSURE: 128 MMHG | RESPIRATION RATE: 18 BRPM | HEART RATE: 94 BPM | HEIGHT: 60 IN | WEIGHT: 146.13 LBS

## 2023-09-12 DIAGNOSIS — M54.2 NECK PAIN: ICD-10-CM

## 2023-09-12 DIAGNOSIS — R51.9 FREQUENT HEADACHES: Primary | ICD-10-CM

## 2023-09-12 DIAGNOSIS — R42 DIZZINESS AND GIDDINESS: ICD-10-CM

## 2023-09-12 DIAGNOSIS — Z98.890 HISTORY OF BACK SURGERY: ICD-10-CM

## 2023-09-12 DIAGNOSIS — R11.2 NAUSEA AND VOMITING, UNSPECIFIED VOMITING TYPE: ICD-10-CM

## 2023-09-12 PROCEDURE — 72050 X-RAY EXAM NECK SPINE 4/5VWS: CPT | Mod: TC

## 2023-09-12 PROCEDURE — 99214 PR OFFICE/OUTPT VISIT, EST, LEVL IV, 30-39 MIN: ICD-10-PCS | Mod: ,,, | Performed by: NURSE PRACTITIONER

## 2023-09-12 PROCEDURE — 99214 OFFICE O/P EST MOD 30 MIN: CPT | Mod: ,,, | Performed by: NURSE PRACTITIONER

## 2023-09-12 RX ORDER — TIZANIDINE 2 MG/1
4 TABLET ORAL EVERY 8 HOURS PRN
Qty: 30 TABLET | Refills: 0 | Status: SHIPPED | OUTPATIENT
Start: 2023-09-12 | End: 2023-09-22

## 2023-09-13 DIAGNOSIS — J01.80 ACUTE NON-RECURRENT SINUSITIS OF OTHER SINUS: Primary | ICD-10-CM

## 2023-09-13 RX ORDER — CEFDINIR 300 MG/1
300 CAPSULE ORAL 2 TIMES DAILY
Qty: 20 CAPSULE | Refills: 0 | Status: SHIPPED | OUTPATIENT
Start: 2023-09-13 | End: 2023-09-23

## 2023-09-13 NOTE — PROGRESS NOTES
stated he has a benson cervical traction machine that stretches his neck. It was ordered through physical therapy, he wants to know if that is an option for his wife. She states the neck pain is still the same. Please advise.

## 2023-09-13 NOTE — PROGRESS NOTES
Patient advised. She will try the muscle relaxers to see if that helps. If not, she will give us a call back.

## 2023-09-20 ENCOUNTER — TELEPHONE (OUTPATIENT)
Dept: INTERNAL MEDICINE | Facility: CLINIC | Age: 68
End: 2023-09-20
Payer: MEDICARE

## 2023-09-20 NOTE — TELEPHONE ENCOUNTER
----- Message from Hanh Leggett LPN sent at 9/19/2023  4:01 PM CDT -----  Regarding: MATTHEW Christie 9/26/23 @10:00a  Are there any outstanding tasks in the chart? Pt will need fasting labs    Is there any documentation of tasks? no    Has patient seen another physician, been to the ER, C, or admitted to hospital since last visit?    Has the patient done blood work or imaging since last visit?

## 2023-09-22 ENCOUNTER — TELEPHONE (OUTPATIENT)
Dept: INTERNAL MEDICINE | Facility: CLINIC | Age: 68
End: 2023-09-22
Payer: MEDICARE

## 2023-09-22 LAB
ALBUMIN SERPL-MCNC: 4.8 G/DL (ref 3.9–4.9)
ALBUMIN/GLOB SERPL: 2.1 {RATIO} (ref 1.2–2.2)
ALP SERPL-CCNC: 68 IU/L (ref 44–121)
ALT SERPL-CCNC: 15 IU/L (ref 0–32)
AST SERPL-CCNC: 16 IU/L (ref 0–40)
BASOPHILS # BLD AUTO: 0 X10E3/UL (ref 0–0.2)
BASOPHILS NFR BLD AUTO: 0 %
BILIRUB SERPL-MCNC: 0.3 MG/DL (ref 0–1.2)
BUN SERPL-MCNC: 18 MG/DL (ref 8–27)
BUN/CREAT SERPL: 17 (ref 12–28)
CALCIUM SERPL-MCNC: 10 MG/DL (ref 8.7–10.3)
CHLORIDE SERPL-SCNC: 103 MMOL/L (ref 96–106)
CHOLEST SERPL-MCNC: 165 MG/DL (ref 100–199)
CO2 SERPL-SCNC: 23 MMOL/L (ref 20–29)
CREAT SERPL-MCNC: 1.09 MG/DL (ref 0.57–1)
EOSINOPHIL # BLD AUTO: 0.1 X10E3/UL (ref 0–0.4)
EOSINOPHIL NFR BLD AUTO: 1 %
ERYTHROCYTE [DISTWIDTH] IN BLOOD BY AUTOMATED COUNT: 13 % (ref 11.7–15.4)
EST. GFR  (NO RACE VARIABLE): 56 ML/MIN/1.73
GLOBULIN SER CALC-MCNC: 2.3 G/DL (ref 1.5–4.5)
GLUCOSE SERPL-MCNC: 107 MG/DL (ref 70–99)
HBA1C MFR BLD: 5.9 % (ref 4.8–5.6)
HCT VFR BLD AUTO: 42.3 % (ref 34–46.6)
HDLC SERPL-MCNC: 50 MG/DL
HGB BLD-MCNC: 13.5 G/DL (ref 11.1–15.9)
IMM GRANULOCYTES NFR BLD AUTO: 0 %
LDLC SERPL CALC-MCNC: 77 MG/DL (ref 0–99)
LYMPHOCYTES # BLD AUTO: 1.9 X10E3/UL (ref 0.7–3.1)
LYMPHOCYTES NFR BLD AUTO: 32 %
MCH RBC QN AUTO: 30.3 PG (ref 26.6–33)
MCHC RBC AUTO-ENTMCNC: 31.9 G/DL (ref 31.5–35.7)
MCV RBC AUTO: 95 FL (ref 79–97)
MONOCYTES # BLD AUTO: 0.5 X10E3/UL (ref 0.1–0.9)
MONOCYTES NFR BLD AUTO: 8 %
NEUTROPHILS # BLD AUTO: 3.5 X10E3/UL (ref 1.4–7)
NEUTROPHILS NFR BLD AUTO: 59 %
PLATELET # BLD AUTO: 277 X10E3/UL (ref 150–450)
POTASSIUM SERPL-SCNC: 4.2 MMOL/L (ref 3.5–5.2)
PROT SERPL-MCNC: 7.1 G/DL (ref 6–8.5)
RBC # BLD AUTO: 4.46 X10E6/UL (ref 3.77–5.28)
SODIUM SERPL-SCNC: 141 MMOL/L (ref 134–144)
SPECIMEN STATUS REPORT: NORMAL
T4 FREE SERPL-MCNC: 1.23 NG/DL (ref 0.82–1.77)
TRIGL SERPL-MCNC: 231 MG/DL (ref 0–149)
TSH SERPL DL<=0.005 MIU/L-ACNC: 0.3 UIU/ML (ref 0.45–4.5)
VLDLC SERPL CALC-MCNC: 38 MG/DL (ref 5–40)
WBC # BLD AUTO: 6 X10E3/UL (ref 3.4–10.8)

## 2023-09-22 NOTE — TELEPHONE ENCOUNTER
----- Message from [facility] sent at 9/22/2023 10:09 AM CDT -----  .Type:  Needs Medical Advice    Who Called:  pt      Would the patient rather a call back? Yes    Best Call Back Number:  [phone]    Additional Information:  pt wants to  her papers of  her x rays of her neck she wants to bring them to another doctor thanks

## 2023-10-12 ENCOUNTER — TELEPHONE (OUTPATIENT)
Dept: INTERNAL MEDICINE | Facility: CLINIC | Age: 68
End: 2023-10-12
Payer: MEDICARE

## 2023-10-12 NOTE — TELEPHONE ENCOUNTER
----- Message from Hanh Leggett LPN sent at 10/12/2023  8:03 AM CDT -----  Regarding: MATTHEW Christie 10/20/23 @0780  Are there any outstanding tasks in the chart? no    Is there any documentation of tasks? no    Has patient seen another physician, been to the ER, [facility], or admitted to hospital since last visit?    Has the patient done blood work or imaging since last visit?

## 2023-10-20 ENCOUNTER — OFFICE VISIT (OUTPATIENT)
Dept: INTERNAL MEDICINE | Facility: CLINIC | Age: 68
End: 2023-10-20
Payer: MEDICARE

## 2023-10-20 ENCOUNTER — HOSPITAL ENCOUNTER (OUTPATIENT)
Dept: RADIOLOGY | Facility: HOSPITAL | Age: 68
Discharge: HOME | End: 2023-10-20
Attending: INTERNAL MEDICINE
Payer: MEDICARE

## 2023-10-20 VITALS
HEIGHT: 60 IN | HEART RATE: 80 BPM | RESPIRATION RATE: 18 BRPM | SYSTOLIC BLOOD PRESSURE: 132 MMHG | DIASTOLIC BLOOD PRESSURE: 68 MMHG | WEIGHT: 150 LBS | OXYGEN SATURATION: 97 % | BODY MASS INDEX: 29.45 KG/M2

## 2023-10-20 DIAGNOSIS — E05.90 SUBCLINICAL HYPERTHYROIDISM: ICD-10-CM

## 2023-10-20 DIAGNOSIS — E78.5 HYPERLIPIDEMIA, UNSPECIFIED HYPERLIPIDEMIA TYPE: ICD-10-CM

## 2023-10-20 DIAGNOSIS — Z00.00 ENCOUNTER FOR MEDICARE ANNUAL WELLNESS EXAM: ICD-10-CM

## 2023-10-20 DIAGNOSIS — M54.6 CHRONIC MIDLINE THORACIC BACK PAIN: ICD-10-CM

## 2023-10-20 DIAGNOSIS — Z12.31 VISIT FOR SCREENING MAMMOGRAM: Primary | ICD-10-CM

## 2023-10-20 DIAGNOSIS — I10 BENIGN HYPERTENSION: ICD-10-CM

## 2023-10-20 DIAGNOSIS — G89.29 CHRONIC MIDLINE THORACIC BACK PAIN: ICD-10-CM

## 2023-10-20 DIAGNOSIS — Z12.31 BREAST CANCER SCREENING BY MAMMOGRAM: ICD-10-CM

## 2023-10-20 DIAGNOSIS — E04.1 THYROID NODULE: ICD-10-CM

## 2023-10-20 PROBLEM — M54.9 CHRONIC BACK PAIN: Status: ACTIVE | Noted: 2023-10-20

## 2023-10-20 PROBLEM — H93.19 TINNITUS: Status: RESOLVED | Noted: 2022-08-25 | Resolved: 2023-10-20

## 2023-10-20 PROBLEM — K22.2 ESOPHAGEAL OBSTRUCTION: Status: RESOLVED | Noted: 2023-03-22 | Resolved: 2023-10-20

## 2023-10-20 PROBLEM — K22.0 ACHALASIA OF DIGESTIVE TRACT: Status: RESOLVED | Noted: 2022-05-17 | Resolved: 2023-10-20

## 2023-10-20 PROCEDURE — G0402 INITIAL PREVENTIVE EXAM: HCPCS | Mod: ,,, | Performed by: INTERNAL MEDICINE

## 2023-10-20 PROCEDURE — 72070 X-RAY EXAM THORAC SPINE 2VWS: CPT | Mod: TC

## 2023-10-20 PROCEDURE — G0402 PR WELCOME MEDICARE PREVENTIVE VISIT NEW ENROLLEE: ICD-10-PCS | Mod: ,,, | Performed by: INTERNAL MEDICINE

## 2023-10-20 RX ORDER — ASCORBIC ACID 250 MG
1000 TABLET ORAL DAILY
COMMUNITY

## 2023-10-20 RX ORDER — PREGABALIN 75 MG/1
75 CAPSULE ORAL 2 TIMES DAILY
Qty: 60 CAPSULE | Refills: 6 | Status: SHIPPED | OUTPATIENT
Start: 2023-10-20 | End: 2024-04-19

## 2023-10-20 NOTE — ASSESSMENT & PLAN NOTE
Will get xray of the thoracic spine.  Refer to Pilates based PT. She feels that Lyrica helps.  Will rx.  She will take prn.
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
## 2023-10-20 NOTE — ASSESSMENT & PLAN NOTE
Health Maintenance Due   Topic Date Due    HIV Screening  Never done    Shingles Vaccine (3 of 3) 03/13/2020    Pneumococcal Vaccines (Age 65+) (1 - PCV) Never done    Mammogram  05/10/2023     She says she received a pneumonia vaccine at [facility] in May.

## 2023-10-23 ENCOUNTER — TELEPHONE (OUTPATIENT)
Dept: INTERNAL MEDICINE | Facility: CLINIC | Age: 68
End: 2023-10-23
Payer: MEDICARE

## 2023-10-23 NOTE — TELEPHONE ENCOUNTER
I spoke with patient and confirmed past immunization. Updated her chart. She verbalized understanding.

## 2023-10-23 NOTE — TELEPHONE ENCOUNTER
----- Message from Lisa Finn sent at 10/20/2023 12:48 PM CDT -----  .Type:  Needs Medical Advice    Who Called: pt  Symptoms (please be specific):    How long has patient had these symptoms:    Pharmacy name and phone #:    Would the patient rather a call back or a response via [facility]?   Best Call Back Number: [phone]  Additional Information: the name of the shot she took on May 5th 2023 Pneumonia shot Prevnar 20

## 2023-10-24 NOTE — PROGRESS NOTES
Let her know the xray shows arthritis in hre back.  No fractures.  Proceed with plan as discussed at visit.

## 2023-10-26 DIAGNOSIS — M19.90 ARTHRITIS: ICD-10-CM

## 2023-10-26 RX ORDER — CELECOXIB 200 MG/1
200 CAPSULE ORAL
Qty: 90 CAPSULE | Refills: 0 | Status: SHIPPED | OUTPATIENT
Start: 2023-10-26 | End: 2023-12-29

## 2023-11-01 ENCOUNTER — HOSPITAL ENCOUNTER (OUTPATIENT)
Dept: RADIOLOGY | Facility: HOSPITAL | Age: 68
Discharge: HOME | End: 2023-11-01
Attending: INTERNAL MEDICINE
Payer: MEDICARE

## 2023-11-01 DIAGNOSIS — Z12.31 VISIT FOR SCREENING MAMMOGRAM: ICD-10-CM

## 2023-11-01 PROCEDURE — 77067 SCR MAMMO BI INCL CAD: CPT | Mod: 26,,, | Performed by: STUDENT IN AN ORGANIZED HEALTH CARE EDUCATION/TRAINING PROGRAM

## 2023-11-01 PROCEDURE — 77067 MAMMO DIGITAL SCREENING BILAT WITH TOMO: ICD-10-PCS | Mod: 26,,, | Performed by: STUDENT IN AN ORGANIZED HEALTH CARE EDUCATION/TRAINING PROGRAM

## 2023-11-01 PROCEDURE — 77063 BREAST TOMOSYNTHESIS BI: CPT | Mod: 26,,, | Performed by: STUDENT IN AN ORGANIZED HEALTH CARE EDUCATION/TRAINING PROGRAM

## 2023-11-01 PROCEDURE — 77067 SCR MAMMO BI INCL CAD: CPT | Mod: TC

## 2023-11-01 PROCEDURE — 77063 MAMMO DIGITAL SCREENING BILAT WITH TOMO: ICD-10-PCS | Mod: 26,,, | Performed by: STUDENT IN AN ORGANIZED HEALTH CARE EDUCATION/TRAINING PROGRAM

## 2023-12-29 DIAGNOSIS — M19.90 ARTHRITIS: ICD-10-CM

## 2023-12-29 DIAGNOSIS — J30.2 SEASONAL ALLERGIES: ICD-10-CM

## 2023-12-29 DIAGNOSIS — Z79.890 HORMONE REPLACEMENT THERAPY: ICD-10-CM

## 2023-12-29 RX ORDER — ESTRADIOL 1 MG/1
1 TABLET ORAL
Qty: 90 TABLET | Refills: 0 | Status: SHIPPED | OUTPATIENT
Start: 2023-12-29 | End: 2024-03-11

## 2023-12-29 RX ORDER — CELECOXIB 200 MG/1
200 CAPSULE ORAL
Qty: 90 CAPSULE | Refills: 0 | Status: SHIPPED | OUTPATIENT
Start: 2023-12-29

## 2023-12-29 RX ORDER — MONTELUKAST SODIUM 10 MG/1
10 TABLET ORAL
Qty: 90 TABLET | Refills: 0 | Status: SHIPPED | OUTPATIENT
Start: 2023-12-29

## 2024-01-22 PROBLEM — Z00.00 ENCOUNTER FOR MEDICARE ANNUAL WELLNESS EXAM: Status: RESOLVED | Noted: 2023-10-20 | Resolved: 2024-01-22

## 2024-03-11 DIAGNOSIS — Z79.890 HORMONE REPLACEMENT THERAPY: ICD-10-CM

## 2024-03-11 RX ORDER — ESTRADIOL 1 MG/1
1 TABLET ORAL
Qty: 90 TABLET | Refills: 1 | Status: SHIPPED | OUTPATIENT
Start: 2024-03-11

## 2024-04-16 ENCOUNTER — TELEPHONE (OUTPATIENT)
Dept: INTERNAL MEDICINE | Facility: CLINIC | Age: 69
End: 2024-04-16
Payer: MEDICARE

## 2024-04-16 NOTE — TELEPHONE ENCOUNTER
----- Message from Hanh Leggett LPN sent at 4/15/2024  2:49 PM CDT -----  Regarding: MATTHEW Christie 4/23/24 @0900  Are there any outstanding tasks in the chart? no    Is there any documentation of tasks? no    Please tell patient to bring living will, power or , or advance directive document to visit if they have it.

## 2024-05-21 ENCOUNTER — LAB VISIT (OUTPATIENT)
Dept: LAB | Facility: HOSPITAL | Age: 69
End: 2024-05-21
Attending: INTERNAL MEDICINE
Payer: MEDICARE

## 2024-05-21 ENCOUNTER — OFFICE VISIT (OUTPATIENT)
Dept: INTERNAL MEDICINE | Facility: CLINIC | Age: 69
End: 2024-05-21
Payer: MEDICARE

## 2024-05-21 VITALS
OXYGEN SATURATION: 97 % | SYSTOLIC BLOOD PRESSURE: 124 MMHG | HEART RATE: 78 BPM | DIASTOLIC BLOOD PRESSURE: 70 MMHG | RESPIRATION RATE: 18 BRPM | HEIGHT: 60 IN | WEIGHT: 160 LBS | BODY MASS INDEX: 31.41 KG/M2

## 2024-05-21 DIAGNOSIS — I10 BENIGN HYPERTENSION: Primary | ICD-10-CM

## 2024-05-21 DIAGNOSIS — R19.7 DIARRHEA, UNSPECIFIED TYPE: ICD-10-CM

## 2024-05-21 DIAGNOSIS — E05.90 SUBCLINICAL HYPERTHYROIDISM: ICD-10-CM

## 2024-05-21 DIAGNOSIS — R30.0 DYSURIA: ICD-10-CM

## 2024-05-21 LAB
BACTERIA #/AREA URNS AUTO: ABNORMAL /HPF
BILIRUB UR QL STRIP.AUTO: NEGATIVE
CLARITY UR: CLEAR
COLOR UR AUTO: YELLOW
GLUCOSE UR QL STRIP: NORMAL
HGB UR QL STRIP: NEGATIVE
KETONES UR QL STRIP: NEGATIVE
LEUKOCYTE ESTERASE UR QL STRIP: NEGATIVE
MUCOUS THREADS URNS QL MICRO: ABNORMAL /LPF
NITRITE UR QL STRIP: NEGATIVE
PH UR STRIP: 6.5 [PH]
PROT UR QL STRIP: NEGATIVE
RBC #/AREA URNS AUTO: ABNORMAL /HPF
SP GR UR STRIP.AUTO: 1.02 (ref 1–1.03)
SQUAMOUS #/AREA URNS LPF: ABNORMAL /HPF
T4 FREE SERPL-MCNC: 1.07 NG/DL (ref 0.7–1.48)
TSH SERPL-ACNC: 0.23 UIU/ML (ref 0.35–4.94)
UROBILINOGEN UR STRIP-ACNC: 2
WBC #/AREA URNS AUTO: ABNORMAL /HPF

## 2024-05-21 PROCEDURE — 99214 OFFICE O/P EST MOD 30 MIN: CPT | Mod: ,,, | Performed by: INTERNAL MEDICINE

## 2024-05-21 PROCEDURE — 84443 ASSAY THYROID STIM HORMONE: CPT

## 2024-05-21 PROCEDURE — 36415 COLL VENOUS BLD VENIPUNCTURE: CPT

## 2024-05-21 PROCEDURE — 81001 URINALYSIS AUTO W/SCOPE: CPT

## 2024-05-21 PROCEDURE — 84439 ASSAY OF FREE THYROXINE: CPT

## 2024-05-21 RX ORDER — SULFAMETHOXAZOLE AND TRIMETHOPRIM 800; 160 MG/1; MG/1
1 TABLET ORAL 2 TIMES DAILY
Qty: 6 TABLET | Refills: 0 | Status: SHIPPED | OUTPATIENT
Start: 2024-05-21 | End: 2024-05-24

## 2024-05-21 NOTE — PROGRESS NOTES
Subjective:      Chief Complaint: Follow-up (6mo/C/o diarrhea X3mo- she has been taking imodium )      HPI:She is here for f/u htn.  She has been having some dysuria since last Thursday.  She's also been having some diarrhea.  Its worse in the mornings.  She's been taking imodium and kaopectate.  She has chronic issues with diarrhea.  She doesn't drink milk.  She does eat cheese and ice cream -- 1-2 times per week on the ice cream.  There is no blood in her stool, but the kaopectate makes her stool black.  Her stool is like a stringy mucus with some undissolved food and is slimey, then its yellow.  She does have a h/o a cholecysteomy.  She drinks city water that is filtered.  She is having some gas and bloating.  She's seen Dr. Montalvo in the past.  Eating makes the diarrhea worse.  She doesn't really have a tremor unless she skips a meal.      She's also been having some reflux -- but relates it to frequent mexican food and salsa.      She states the lyrica has helped her back a lot.    She does check her BP at home, and its been 97/70s.  Its either normal or low at home.  She gets occ light headedness.  Problem Noted   Diarrhea 5/21/2024   Dysuria 5/21/2024   Chronic Back Pain 10/20/2023   Hip Pain 8/25/2022   Arthritis 5/17/2022   Gastroesophageal Reflux Disease 5/17/2022   Dilatation of Esophagus 5/17/2022    STATES DILATED ABOUT EVERY 2 YEARS     Degeneration of Intervertebral Disc of Cervical Region 5/17/2022   Personal History of Colonic Polyps 5/17/2022   History of Renal Cell Carcinoma 5/17/2022    She is followed by Dr. Vera, dx'ed in approx. 2017     Hyperlipidemia 5/17/2022    SAW DR ZARATE 1/4/2016 CAROTID US DONE AT THAT TIME     Sleep Apnea 5/17/2022    non compliant with CPAP     Subclinical Hyperthyroidism 5/17/2022   Thyroid Nodule 5/17/2022    followed by Dr. Davis -- she has 3 nodules being followed.     Diverticulosis of Colon 11/8/2013   Family History of Colonic Polyps 11/8/2013    Diaphragmatic Hernia Without Obstruction Or Gangrene 9/20/2013   Encounter for Medicare Annual Wellness Exam (Resolved) 10/20/2023   Disease of Gallbladder, Unspecified (Resolved) 3/22/2023   Benign Hypertension (Resolved) 3/22/2023   Esophageal Obstruction (Resolved) 3/22/2023   Tinnitus (Resolved) 8/25/2022   Palpitations (Resolved) 8/25/2022   Achalasia of Digestive Tract (Resolved) 5/17/2022   Diverticulum of Esophagus, Acquired (Resolved) 9/20/2013        The patient's Health Maintenance was reviewed and the following appears to be due:   Health Maintenance Due   Topic Date Due    Shingles Vaccine (3 of 3) 03/13/2020    COVID-19 Vaccine (5 - 2023-24 season) 02/13/2024       Past Medical History:  Past Medical History:   Diagnosis Date    Achalasia     Achalasia of digestive tract 05/17/2022    Benign hypertension 03/22/2023    Cancer of left kidney     removed 30% of left kidney    Disease of gallbladder, unspecified 03/22/2023    Diverticulitis     Diverticulum of esophagus, acquired 09/20/2013    Dysphagia     Early satiety     Epigastric fullness     Esophageal obstruction 03/22/2023    Fibroid tumor     positive for cancer    High cholesterol     Osteoarthritis of both hips     Osteoporosis     S/P ASHOK (total abdominal hysterectomy)     had a fibroid    Sleep apnea     CPAP    Tinnitus, left      Review of patient's allergies indicates:   Allergen Reactions    Diphenhydramine hcl Other (See Comments)    Hydrocodone-acetaminophen      Other reaction(s): weird feeling     Current Outpatient Medications on File Prior to Visit   Medication Sig Dispense Refill    benzonatate (TESSALON) 200 MG capsule Take 1 capsule (200 mg total) by mouth 3 (three) times daily as needed for Cough. 90 capsule 3    calcium-vitamin D 600 mg-10 mcg (400 unit) Tab Take 2 tablets by mouth once daily.      estradioL (ESTRACE) 1 MG tablet Take 1 tablet by mouth once daily 90 tablet 1    ezetimibe (ZETIA) 10 mg tablet Take 10 mg by  mouth every evening.      fenofibrate micronized (LOFIBRA) 67 MG capsule Take 67 mg by mouth every evening.      montelukast (SINGULAIR) 10 mg tablet Take 1 tablet by mouth once daily 90 tablet 0    potassium chloride 10% (KAYCIEL) 20 mEq/15 mL oral solution Take 20 mEq by mouth once daily. One tablespoon in the morning and night      simvastatin (ZOCOR) 40 MG tablet   See Instructions, TAKE 1 TABLET BY MOUTH EVERY DAY AT BEDTIME, # 30 tab(s), 11 Refill(s), Pharmacy: [id number], 153, cm, Height/Length Dosing, 08/23/21 11:25:00 CDT, 65.77, kg, Weight Dosing, 08/23/21 11:25:00 CDT      pregabalin (LYRICA) 75 MG capsule Take 1 capsule (75 mg total) by mouth 2 (two) times daily. 60 capsule 6    [DISCONTINUED] ascorbic acid, vitamin C, (VITAMIN C) 250 MG tablet Take 1,000 mg by mouth once daily.      [DISCONTINUED] celecoxib (CELEBREX) 200 MG capsule Take 1 capsule by mouth once daily 90 capsule 0     No current facility-administered medications on file prior to visit.       Review of Systems   Respiratory:  Negative for shortness of breath.    Cardiovascular:  Negative for chest pain and palpitations.   Gastrointestinal:  Negative for diarrhea.   Genitourinary:  Positive for dysuria.       Objective:   /70 (BP Location: Right arm, Patient Position: Sitting, BP Method: Small (Manual))   Pulse 78   Resp 18   Ht 5' (1.524 m)   Wt 72.6 kg (160 lb)   SpO2 97%   BMI 31.25 kg/m²     Physical Exam  Constitutional:       General: She is not in acute distress.     Appearance: Normal appearance.   HENT:      Head: Normocephalic and atraumatic.   Eyes:      General: No scleral icterus.     Conjunctiva/sclera: Conjunctivae normal.   Neck:      Vascular: No carotid bruit.   Cardiovascular:      Rate and Rhythm: Normal rate and regular rhythm.      Pulses: Normal pulses.      Heart sounds: Normal heart sounds. No murmur heard.     No friction rub. No gallop.   Pulmonary:      Effort: Pulmonary effort is  normal.      Breath sounds: Normal breath sounds.   Abdominal:      General: Bowel sounds are normal.      Palpations: Abdomen is soft. There is no mass.      Tenderness: There is no abdominal tenderness. There is no guarding or rebound.   Musculoskeletal:         General: No deformity.      Cervical back: No rigidity or tenderness.      Right lower leg: No edema.      Left lower leg: No edema.   Lymphadenopathy:      Cervical: No cervical adenopathy.   Skin:     Coloration: Skin is not jaundiced or pale.      Findings: No erythema.   Neurological:      General: No focal deficit present.      Mental Status: She is alert and oriented to person, place, and time.      Gait: Gait normal.   Psychiatric:         Mood and Affect: Mood normal.         Behavior: Behavior normal.         Thought Content: Thought content normal.         Judgment: Judgment normal.       Assessment and Plan:     Diarrhea  I rec a lactose free diet and trial of citrucel.  Call if sx aren't better in 2 weeks and I will refer to GI.    Dysuria  Bactrim and u/a.      Subclinical hyperthyroidism  Check levels given recent diarrhea.      Follow up in about 6 months (around 11/21/2024).    Medications Ordered This Encounter   Medications    sulfamethoxazole-trimethoprim 800-160mg (BACTRIM DS) 800-160 mg Tab     Sig: Take 1 tablet by mouth 2 (two) times daily. for 3 days     Dispense:  6 tablet     Refill:  0     [unfilled]  Orders Placed This Encounter   Procedures    TSH     Standing Status:   Future     Standing Expiration Date:   7/20/2025    T4, Free     Standing Status:   Future     Standing Expiration Date:   7/20/2025    CBC Auto Differential     Standing Status:   Future     Standing Expiration Date:   7/20/2025    Comprehensive Metabolic Panel     Standing Status:   Future     Standing Expiration Date:   7/20/2025    Lipid Panel     Standing Status:   Future     Standing Expiration Date:   11/21/2025    TSH     Standing Status:   Future      Standing Expiration Date:   7/20/2025    T4, Free     Standing Status:   Future     Standing Expiration Date:   7/20/2025    Urinalysis, Reflex to Urine Culture     Standing Status:   Future     Standing Expiration Date:   7/20/2025     Order Specific Question:   Specimen Source     Answer:   Urine

## 2024-05-21 NOTE — ASSESSMENT & PLAN NOTE
I rec a lactose free diet and trial of citrucel.  Call if sx aren't better in 2 weeks and I will refer to GI.

## 2024-05-31 NOTE — PROGRESS NOTES
Let her know there thyroid levels look about the same.  If the diarrhea doesn't improve off all dairy, she can let me know.

## 2024-09-13 DIAGNOSIS — Z79.890 HORMONE REPLACEMENT THERAPY: ICD-10-CM

## 2024-09-13 RX ORDER — ESTRADIOL 1 MG/1
1 TABLET ORAL
Qty: 90 TABLET | Refills: 1 | Status: SHIPPED | OUTPATIENT
Start: 2024-09-13

## 2024-10-01 DIAGNOSIS — J30.2 SEASONAL ALLERGIES: ICD-10-CM

## 2024-10-01 RX ORDER — MONTELUKAST SODIUM 10 MG/1
10 TABLET ORAL
Qty: 90 TABLET | Refills: 0 | Status: SHIPPED | OUTPATIENT
Start: 2024-10-01

## 2024-11-18 ENCOUNTER — TELEPHONE (OUTPATIENT)
Dept: INTERNAL MEDICINE | Facility: CLINIC | Age: 69
End: 2024-11-18
Payer: MEDICARE

## 2024-11-18 NOTE — TELEPHONE ENCOUNTER
----- Message from Med Assistant Georgiana sent at 11/18/2024  1:47 PM CST -----  Regarding: MATTHEW Christie 11/25/24 @0920  Patient will need fasting labs.

## 2024-11-19 LAB
ALBUMIN SERPL-MCNC: 4.1 G/DL (ref 3.9–4.9)
ALP SERPL-CCNC: 64 IU/L (ref 44–121)
ALT SERPL-CCNC: 34 IU/L (ref 0–32)
APPEARANCE UR: ABNORMAL
AST SERPL-CCNC: 17 IU/L (ref 0–40)
BACTERIA #/AREA URNS HPF: NORMAL /[HPF]
BASOPHILS # BLD AUTO: 0 X10E3/UL (ref 0–0.2)
BASOPHILS NFR BLD AUTO: 0 %
BILIRUB SERPL-MCNC: 0.5 MG/DL (ref 0–1.2)
BILIRUB UR QL STRIP: NEGATIVE
BUN SERPL-MCNC: 20 MG/DL (ref 8–27)
BUN/CREAT SERPL: 21 (ref 12–28)
CALCIUM SERPL-MCNC: 9.5 MG/DL (ref 8.7–10.3)
CHLORIDE SERPL-SCNC: 103 MMOL/L (ref 96–106)
CHOLEST SERPL-MCNC: 136 MG/DL (ref 100–199)
CO2 SERPL-SCNC: 21 MMOL/L (ref 20–29)
COLOR UR: YELLOW
CREAT SERPL-MCNC: 0.94 MG/DL (ref 0.57–1)
CRYSTALS URNS MICRO: NORMAL
EOSINOPHIL # BLD AUTO: 0 X10E3/UL (ref 0–0.4)
EOSINOPHIL NFR BLD AUTO: 1 %
EPI CELLS #/AREA URNS HPF: NORMAL /HPF (ref 0–10)
ERYTHROCYTE [DISTWIDTH] IN BLOOD BY AUTOMATED COUNT: 14.1 % (ref 11.7–15.4)
EST. GFR  (NO RACE VARIABLE): 67 ML/MIN/1.73
GLOBULIN SER CALC-MCNC: 2.3 G/DL (ref 1.5–4.5)
GLUCOSE SERPL-MCNC: 138 MG/DL (ref 70–99)
GLUCOSE UR QL STRIP: NEGATIVE
HCT VFR BLD AUTO: 44.3 % (ref 34–46.6)
HDLC SERPL-MCNC: 30 MG/DL
HGB BLD-MCNC: 14.3 G/DL (ref 11.1–15.9)
HGB UR QL STRIP: NEGATIVE
IMM GRANULOCYTES NFR BLD AUTO: 1 %
KETONES UR QL STRIP: NEGATIVE
LDLC SERPL CALC-MCNC: 61 MG/DL (ref 0–99)
LEUKOCYTE ESTERASE UR QL STRIP: NEGATIVE
LYMPHOCYTES # BLD AUTO: 1.7 X10E3/UL (ref 0.7–3.1)
LYMPHOCYTES NFR BLD AUTO: 41 %
MCH RBC QN AUTO: 28.8 PG (ref 26.6–33)
MCHC RBC AUTO-ENTMCNC: 32.3 G/DL (ref 31.5–35.7)
MCV RBC AUTO: 89 FL (ref 79–97)
MICRO URNS: ABNORMAL
MONOCYTES # BLD AUTO: 0.5 X10E3/UL (ref 0.1–0.9)
MONOCYTES NFR BLD AUTO: 12 %
NEUTROPHILS # BLD AUTO: 1.9 X10E3/UL (ref 1.4–7)
NEUTROPHILS NFR BLD AUTO: 45 %
NITRITE UR QL STRIP: NEGATIVE
PH UR STRIP: 6.5 [PH] (ref 5–7.5)
PLATELET # BLD AUTO: 358 X10E3/UL (ref 150–450)
POTASSIUM SERPL-SCNC: 3.8 MMOL/L (ref 3.5–5.2)
PROT SERPL-MCNC: 6.4 G/DL (ref 6–8.5)
PROT UR QL STRIP: ABNORMAL
RBC # BLD AUTO: 4.97 X10E6/UL (ref 3.77–5.28)
RBC #/AREA URNS HPF: NORMAL /HPF (ref 0–2)
SODIUM SERPL-SCNC: 140 MMOL/L (ref 134–144)
SP GR UR STRIP: >=1.03 (ref 1–1.03)
SPECIMEN STATUS REPORT: NORMAL
T4 FREE SERPL-MCNC: 2 NG/DL (ref 0.82–1.77)
TRIGL SERPL-MCNC: 286 MG/DL (ref 0–149)
TSH SERPL DL<=0.005 MIU/L-ACNC: 0.07 UIU/ML (ref 0.45–4.5)
URINALYSIS REFLEX: ABNORMAL
URNS CMNT MICRO: NORMAL
UROBILINOGEN UR STRIP-MCNC: 0.2 MG/DL (ref 0.2–1)
VLDLC SERPL CALC-MCNC: 45 MG/DL (ref 5–40)
WBC # BLD AUTO: 4.1 X10E3/UL (ref 3.4–10.8)
WBC #/AREA URNS HPF: NORMAL /HPF (ref 0–5)

## 2024-11-25 ENCOUNTER — OFFICE VISIT (OUTPATIENT)
Dept: INTERNAL MEDICINE | Facility: CLINIC | Age: 69
End: 2024-11-25
Payer: MEDICARE

## 2024-11-25 VITALS
SYSTOLIC BLOOD PRESSURE: 110 MMHG | HEART RATE: 77 BPM | BODY MASS INDEX: 29.25 KG/M2 | OXYGEN SATURATION: 98 % | WEIGHT: 149 LBS | DIASTOLIC BLOOD PRESSURE: 62 MMHG | HEIGHT: 60 IN

## 2024-11-25 DIAGNOSIS — E78.5 HYPERLIPIDEMIA, UNSPECIFIED HYPERLIPIDEMIA TYPE: ICD-10-CM

## 2024-11-25 DIAGNOSIS — R34 DECREASED URINE OUTPUT: ICD-10-CM

## 2024-11-25 DIAGNOSIS — E04.1 THYROID NODULE: ICD-10-CM

## 2024-11-25 DIAGNOSIS — Z00.00 ENCOUNTER FOR MEDICARE ANNUAL WELLNESS EXAM: ICD-10-CM

## 2024-11-25 DIAGNOSIS — Z23 FLU VACCINE NEED: ICD-10-CM

## 2024-11-25 DIAGNOSIS — E05.90 HYPERTHYROIDISM: ICD-10-CM

## 2024-11-25 DIAGNOSIS — R73.9 HYPERGLYCEMIA: ICD-10-CM

## 2024-11-25 DIAGNOSIS — K52.9 GASTROENTERITIS: ICD-10-CM

## 2024-11-25 DIAGNOSIS — Z12.31 BREAST CANCER SCREENING BY MAMMOGRAM: Primary | ICD-10-CM

## 2024-11-25 DIAGNOSIS — E05.90 SUBCLINICAL HYPERTHYROIDISM: ICD-10-CM

## 2024-11-25 PROBLEM — R19.7 DIARRHEA: Status: RESOLVED | Noted: 2024-05-21 | Resolved: 2024-11-25

## 2024-11-25 PROBLEM — G89.29 CHRONIC BACK PAIN: Status: RESOLVED | Noted: 2023-10-20 | Resolved: 2024-11-25

## 2024-11-25 PROBLEM — M54.9 CHRONIC BACK PAIN: Status: RESOLVED | Noted: 2023-10-20 | Resolved: 2024-11-25

## 2024-11-25 PROBLEM — R30.0 DYSURIA: Status: RESOLVED | Noted: 2024-05-21 | Resolved: 2024-11-25

## 2024-11-25 LAB
ALBUMIN SERPL-MCNC: 3.6 G/DL (ref 3.4–4.8)
ALBUMIN/GLOB SERPL: 1.4 RATIO (ref 1.1–2)
ALP SERPL-CCNC: 53 UNIT/L (ref 40–150)
ALT SERPL-CCNC: 14 UNIT/L (ref 0–55)
ANION GAP SERPL CALC-SCNC: 12 MEQ/L
AST SERPL-CCNC: 14 UNIT/L (ref 5–34)
BILIRUB SERPL-MCNC: 0.3 MG/DL
BUN SERPL-MCNC: 11.5 MG/DL (ref 9.8–20.1)
CALCIUM SERPL-MCNC: 9.2 MG/DL (ref 8.4–10.2)
CHLORIDE SERPL-SCNC: 108 MMOL/L (ref 98–107)
CO2 SERPL-SCNC: 24 MMOL/L (ref 23–31)
CREAT SERPL-MCNC: 0.84 MG/DL (ref 0.55–1.02)
CREAT/UREA NIT SERPL: 14
EST. AVERAGE GLUCOSE BLD GHB EST-MCNC: 125.5 MG/DL
GFR SERPLBLD CREATININE-BSD FMLA CKD-EPI: >60 ML/MIN/1.73/M2
GLOBULIN SER-MCNC: 2.5 GM/DL (ref 2.4–3.5)
GLUCOSE SERPL-MCNC: 116 MG/DL (ref 82–115)
HBA1C MFR BLD: 6 %
POTASSIUM SERPL-SCNC: 4 MMOL/L (ref 3.5–5.1)
PROT SERPL-MCNC: 6.1 GM/DL (ref 5.8–7.6)
SODIUM SERPL-SCNC: 144 MMOL/L (ref 136–145)

## 2024-11-25 PROCEDURE — 83036 HEMOGLOBIN GLYCOSYLATED A1C: CPT | Performed by: INTERNAL MEDICINE

## 2024-11-25 PROCEDURE — 36415 COLL VENOUS BLD VENIPUNCTURE: CPT | Mod: ,,, | Performed by: INTERNAL MEDICINE

## 2024-11-25 PROCEDURE — 36415 COLL VENOUS BLD VENIPUNCTURE: CPT | Performed by: INTERNAL MEDICINE

## 2024-11-25 PROCEDURE — 80053 COMPREHEN METABOLIC PANEL: CPT | Performed by: INTERNAL MEDICINE

## 2024-11-25 PROCEDURE — G0008 ADMIN INFLUENZA VIRUS VAC: HCPCS | Mod: ,,, | Performed by: INTERNAL MEDICINE

## 2024-11-25 PROCEDURE — G0439 PPPS, SUBSEQ VISIT: HCPCS | Mod: ,,, | Performed by: INTERNAL MEDICINE

## 2024-11-25 PROCEDURE — 90653 IIV ADJUVANT VACCINE IM: CPT | Mod: ,,, | Performed by: INTERNAL MEDICINE

## 2024-11-25 RX ORDER — SUCRALFATE 1 G/1
1 TABLET ORAL
Qty: 56 TABLET | Refills: 0 | Status: SHIPPED | OUTPATIENT
Start: 2024-11-25 | End: 2024-12-09

## 2024-11-25 RX ORDER — OMEPRAZOLE 20 MG/1
20 TABLET, DELAYED RELEASE ORAL DAILY
COMMUNITY

## 2024-11-25 RX ORDER — FENOFIBRATE 134 MG/1
134 CAPSULE ORAL
COMMUNITY
Start: 2024-09-12

## 2024-11-25 NOTE — ASSESSMENT & PLAN NOTE
LDL is good.  TG still high.  Will check an A1c.  Continue zetia, fenofibrate and statin.  Looks like Dr. Larios increased the fenofibrate to 134 mg daily.

## 2024-11-25 NOTE — PROGRESS NOTES
Let her know the labs look ok -- nothing concerning.  A1C is 6 -- which is borderline for DM.  Watch diet.

## 2024-11-25 NOTE — PROGRESS NOTES
Subjective:        Patient Care Team:  Aurora Christie MD as PCP - General (Internal Medicine)     Chief Complaint: Medicare AWV (Dizzy, weakness, and diarrhea x3 days. )      HPI:She is here for a medicare wellness.  She had a gastroenteritis last week. She still feels a little light headed, shakey and dizzy.  Her diarrhea resolved.  Overall the chronic diarrhea she was having was better by following a lactose free diet.  Her UOP is reduced -- just about twice daily.  Its orange in the morning and more clear by the afternoon.  She is trying to drink a lot of water.    She's been feeling tremulous recently -- since the illness mentioned above.    She's been going to the gym and has lost a little weight.    She was trying Lyrica for her back, but it wasn't helping.  So she stopped it.  Her back hasn't really been hurting since she's been exercising.  Problem Noted   Encounter for Medicare Annual Wellness Exam 10/20/2023   Gastroenteritis 11/25/2024   Hip Pain 8/25/2022   Arthritis 5/17/2022   Gastroesophageal Reflux Disease 5/17/2022   Dilatation of Esophagus 5/17/2022    STATES DILATED ABOUT EVERY 2 YEARS     Degeneration of Intervertebral Disc of Cervical Region 5/17/2022   Personal History of Colonic Polyps 5/17/2022   History of Renal Cell Carcinoma 5/17/2022    She is followed by Dr. Vera, dx'ed in approx. 2017     Hyperlipidemia 5/17/2022    SAW DR ZARATE 1/4/2016 CAROTID US DONE AT THAT TIME     Sleep Apnea 5/17/2022    non compliant with CPAP     Subclinical Hyperthyroidism 5/17/2022   Thyroid Nodule 5/17/2022    followed by Dr. Davis -- she has 3 nodules being followed.     Diverticulosis of Colon 11/8/2013   Family History of Colonic Polyps 11/8/2013   Diaphragmatic Hernia Without Obstruction Or Gangrene 9/20/2013   Diarrhea (Resolved) 5/21/2024   Dysuria (Resolved) 5/21/2024   Chronic Back Pain (Resolved) 10/20/2023   Disease of Gallbladder, Unspecified (Resolved) 3/22/2023   Benign Hypertension  (Resolved) 3/22/2023   Esophageal Obstruction (Resolved) 3/22/2023   Tinnitus (Resolved) 8/25/2022   Palpitations (Resolved) 8/25/2022   Achalasia of Digestive Tract (Resolved) 5/17/2022   Diverticulum of Esophagus, Acquired (Resolved) 9/20/2013        The patient's Health Maintenance was reviewed and the following appears to be due:   Health Maintenance Due   Topic Date Due    Shingles Vaccine (3 of 3) 03/13/2020    Influenza Vaccine (1) 09/01/2024    COVID-19 Vaccine (5 - 2024-25 season) 09/01/2024    Mammogram  11/02/2024       Problem List  Active Problem List with Overview Notes    Diagnosis Date Noted    Encounter for Medicare annual wellness exam 10/20/2023    Gastroenteritis 11/25/2024    Hip pain 08/25/2022    Arthritis 05/17/2022    Gastroesophageal reflux disease 05/17/2022    Dilatation of esophagus 05/17/2022     STATES DILATED ABOUT EVERY 2 YEARS      Degeneration of intervertebral disc of cervical region 05/17/2022    Personal history of colonic polyps 05/17/2022    History of renal cell carcinoma 05/17/2022     She is followed by Dr. Vera, dx'ed in approx. 2017      Hyperlipidemia 05/17/2022     SAW DR ZARATE 1/4/2016 CAROTID US DONE AT THAT TIME      Sleep apnea 05/17/2022     non compliant with CPAP      Subclinical hyperthyroidism 05/17/2022    Thyroid nodule 05/17/2022     followed by Dr. Davis -- she has 3 nodules being followed.      Diverticulosis of colon 11/08/2013    Family history of colonic polyps 11/08/2013    Diaphragmatic hernia without obstruction or gangrene 09/20/2013       Past Medical History:  Past Medical History:   Diagnosis Date    Achalasia     Achalasia of digestive tract 05/17/2022    Benign hypertension 03/22/2023    Cancer of left kidney     removed 30% of left kidney    Chronic back pain 10/20/2023    Disease of gallbladder, unspecified 03/22/2023    Diverticulitis     Diverticulum of esophagus, acquired 09/20/2013    Dysphagia     Early satiety     Epigastric  fullness     Esophageal obstruction 03/22/2023    Fibroid tumor     positive for cancer    High cholesterol     Osteoarthritis of both hips     Osteoporosis     S/P ASHOK (total abdominal hysterectomy)     had a fibroid    Sleep apnea     CPAP    Tinnitus, left      Past Surgical History:   Procedure Laterality Date    ADENOIDECTOMY  1969    Arm surgery Right     Two plates and 17 screws    BACK SURGERY  2010    CARPAL TUNNEL RELEASE Bilateral 2016    CHOLECYSTECTOMY  2013    COLONOSCOPY  10/06/2022    Repeat 5 yrs due to Diverticulosis    EGD, WITH BALLOON DILATION  02/15/2023    Procedure: EGD, WITH BALLOON DILATION;  Surgeon: Pavel Montalvo MD;  Location: [facility] ENDOSCOPY;  Service: Gastroenterology;;  Balloon Dialation CRE 18,19.20    EGD, WITH LESION ABLATION, WITH ESOPHAGEAL DILATION OVER GUIDE WIRE IF INDICATED  06/09/2023    Dr Murali Cardona    ELBOW SURGERY Right     ESOPHAGEAL MANOMETRY WITH MEASUREMENT OF IMPEDANCE N/A 08/09/2018    Procedure: MANOMETRY, ESOPHAGEAL, WITH IMPEDANCE MEASUREMENT;  Surgeon: Katlyn Em MD;  Location: [facility] (4TH FLR);  Service: Endoscopy;  Laterality: N/A;  Per Dr. Em ok to book these procedures on the same day    ESOPHAGOGASTRODUODENOSCOPY  2017    ESOPHAGOGASTRODUODENOSCOPY N/A 08/09/2018    Procedure: ESOPHAGOGASTRODUODENOSCOPY (EGD);  Surgeon: Katlyn Em MD;  Location: [facility] (4TH FLR);  Service: Endoscopy;  Laterality: N/A;    ESOPHAGOGASTRODUODENOSCOPY N/A 02/15/2023    Procedure: EGD W/ BOTOX;  Surgeon: Pavel Montalvo MD;  Location: [facility] ENDOSCOPY;  Service: Gastroenterology;  Laterality: N/A;  EGD w/ Botox of the LES & balloon dilation to 20mm    ESOPHAGOGASTRODUODENOSCOPY N/A 06/09/2023    Procedure: EGD (ESOPHAGOGASTRODUODENOSCOPY);  Surgeon: Murali Cardona MD;  Location: [facility];  Service: General;  Laterality: N/A;    FOOT SURGERY Right     HEEL    KNEE SURGERY      PARTIAL HYSTERECTOMY      ROTATOR CUFF REPAIR  2012    SHOULDER  SURGERY  2006    SINUS SURGERY  1986    SPINE SURGERY  2010    TONSILLECTOMY      XI ROBOTIC MYOTOMYMONSE N/A 06/09/2023    Procedure: XI ROBOTIC MYOTOMY, MONSE;  Surgeon: Murali Cardona MD;  Location: [facility];  Service: General;  Laterality: N/A;  POSSIBLE THORACIC APPROACH //  XX     Review of patient's allergies indicates:   Allergen Reactions    Diphenhydramine hcl Other (See Comments)    Hydrocodone-acetaminophen      Other reaction(s): weird feeling     Current Outpatient Medications on File Prior to Visit   Medication Sig Dispense Refill    benzonatate (TESSALON) 200 MG capsule Take 1 capsule (200 mg total) by mouth 3 (three) times daily as needed for Cough. 90 capsule 3    calcium-vitamin D 600 mg-10 mcg (400 unit) Tab Take 2 tablets by mouth once daily.      estradioL (ESTRACE) 1 MG tablet Take 1 tablet by mouth once daily 90 tablet 1    ezetimibe (ZETIA) 10 mg tablet Take 10 mg by mouth every evening.      fenofibrate micronized (LOFIBRA) 134 MG Cap Take 134 mg by mouth.      LACTASE ENZYME ORAL Take 2 capsules by mouth once daily.      montelukast (SINGULAIR) 10 mg tablet Take 1 tablet by mouth once daily 90 tablet 0    omeprazole (PRILOSEC OTC) 20 MG tablet Take 20 mg by mouth once daily.      potassium chloride 10% (KAYCIEL) 20 mEq/15 mL oral solution Take 20 mEq by mouth once daily. One tablespoon in the morning and night      simvastatin (ZOCOR) 40 MG tablet   See Instructions, TAKE 1 TABLET BY MOUTH EVERY DAY AT BEDTIME, # 30 tab(s), 11 Refill(s), Pharmacy: [id number], 153, cm, Height/Length Dosing, 08/23/21 11:25:00 CDT, 65.77, kg, Weight Dosing, 08/23/21 11:25:00 CDT      [DISCONTINUED] fenofibrate micronized (LOFIBRA) 67 MG capsule Take 67 mg by mouth every evening.      [DISCONTINUED] pregabalin (LYRICA) 75 MG capsule Take 1 capsule (75 mg total) by mouth 2 (two) times daily. 60 capsule 6     No current facility-administered medications on file prior to visit.     Social  History     Socioeconomic History    Marital status:    Tobacco Use    Smoking status: Never    Smokeless tobacco: Never   Substance and Sexual Activity    Alcohol use: No    Drug use: No    Sexual activity: Yes     Partners: Male     Birth control/protection: Post-menopausal     Social Drivers of Health     Financial Resource Strain: Low Risk  (5/18/2024)    Overall Financial Resource Strain (CARDIA)     Difficulty of Paying Living Expenses: Not hard at all   Food Insecurity: No Food Insecurity (5/18/2024)    Hunger Vital Sign     Worried About Running Out of Food in the Last Year: Never true     Ran Out of Food in the Last Year: Never true   Transportation Needs: No Transportation Needs (10/20/2023)    PRAPARE - Transportation     Lack of Transportation (Medical): No     Lack of Transportation (Non-Medical): No   Physical Activity: Inactive (5/18/2024)    Exercise Vital Sign     Days of Exercise per Week: 0 days     Minutes of Exercise per Session: 0 min   Stress: No Stress Concern Present (5/18/2024)    [employer] - Occupational Stress Questionnaire     Feeling of Stress : Not at all   Housing Stability: Unknown (10/20/2023)    Housing Stability Vital Sign     Unable to Pay for Housing in the Last Year: No     Unstable Housing in the Last Year: No     Family History   Problem Relation Name Age of Onset    Arthritis Mother Raina     Diabetes Mother Raina     Kidney disease Mother Raina         Pass away from kidney problem    Heart defect Father Brian     Arthritis Father Brian     Heart disease Father Brian     Stomach cancer Brother Royal 67    Cancer Brother Royal         Passaway from cancer    Arthritis Sister [facility]     Celiac disease Neg Hx      Colon cancer Neg Hx      Crohn's disease Neg Hx      Esophageal cancer Neg Hx      Inflammatory bowel disease Neg Hx      Irritable bowel syndrome Neg Hx      Liver cancer Neg Hx      Rectal cancer Neg Hx      Ulcerative  colitis Neg Hx         Review of Systems   Respiratory:  Negative for shortness of breath.    Cardiovascular:  Negative for chest pain and palpitations.   Musculoskeletal:  Positive for arthralgias (left hip pain).   Neurological:  Positive for tremors.           Objective:   /62 (BP Location: Right arm, Patient Position: Sitting)   Pulse 77   Ht 5' (1.524 m)   Wt 67.6 kg (149 lb)   SpO2 98%   BMI 29.10 kg/m²     Physical Exam  Constitutional:       General: She is not in acute distress.     Appearance: Normal appearance.   HENT:      Head: Normocephalic and atraumatic.   Eyes:      General: No scleral icterus.     Conjunctiva/sclera: Conjunctivae normal.   Neck:      Vascular: No carotid bruit.      Comments: Sl fullness rt thyroid, no over thyromegaly.  Cardiovascular:      Rate and Rhythm: Normal rate and regular rhythm.      Pulses: Normal pulses.      Heart sounds: Normal heart sounds. No murmur heard.     No friction rub. No gallop.   Pulmonary:      Effort: Pulmonary effort is normal.      Breath sounds: Normal breath sounds.   Abdominal:      General: Bowel sounds are normal.      Palpations: Abdomen is soft. There is no mass.      Tenderness: There is abdominal tenderness (LLQ, mild, no guarding). There is no guarding or rebound.   Musculoskeletal:         General: No deformity.      Cervical back: No rigidity or tenderness.      Right lower leg: No edema.      Left lower leg: No edema.   Lymphadenopathy:      Cervical: No cervical adenopathy.   Skin:     Coloration: Skin is not jaundiced or pale.      Findings: No erythema.   Neurological:      General: No focal deficit present.      Mental Status: She is alert and oriented to person, place, and time.      Gait: Gait normal.   Psychiatric:         Mood and Affect: Mood normal.         Behavior: Behavior normal.         Thought Content: Thought content normal.         Judgment: Judgment normal.         Assessment and Plan:     Encounter for  Medicare annual wellness exam  Health Maintenance Due   Topic Date Due    Shingles Vaccine (3 of 3) 03/13/2020    Influenza Vaccine (1) 09/01/2024    COVID-19 Vaccine (5 - 2024-25 season) 09/01/2024    Mammogram  11/02/2024     Flu shot today.  MMG ordered.  Recent labs reviewed and discussed.  Advance Care Planning    Date: 11/25/2024  Patient did not wish or was not able to name a surrogate decision maker or provide an Advance Care Plan.  I gave her some information on ACP.           Thyroid nodule  Given abnormal TFT, will get u/s and thyroid uptake scan.    Hyperlipidemia  LDL is good.  TG still high.  Will check an A1c.  Continue zetia, fenofibrate and statin.  Looks like Dr. Larios increased the fenofibrate to 134 mg daily.    Gastroenteritis  She is having abd pain and reduced uop.  Wll repeat cmp.  Rx for carafate for 2 weeks.   No follow-ups on file.    Medications Ordered This Encounter   Medications    sucralfate (CARAFATE) 1 gram tablet     Sig: Take 1 tablet (1 g total) by mouth 4 (four) times daily before meals and nightly. for 14 days     Dispense:  56 tablet     Refill:  0     [unfilled]  Orders Placed This Encounter   Procedures    Mammo Digital Screening Bilat w/ Gael     Standing Status:   Future     Standing Expiration Date:   11/25/2026     Order Specific Question:   May the Radiologist modify the order per protocol to meet the clinical needs of the patient?     Answer:   Yes     Order Specific Question:   Release to patient     Answer:   Immediate    NM Thyroid Uptake Single     Standing Status:   Future     Standing Expiration Date:   11/25/2025     Order Specific Question:   May the Radiologist modify the order per protocol to meet the clinical needs of the patient?     Answer:   Yes     Order Specific Question:   Release to patient     Answer:   Immediate    US Thyroid     Standing Status:   Future     Standing Expiration Date:   11/25/2025     Order Specific Question:   May the Radiologist  modify the order per protocol to meet the clinical needs of the patient?     Answer:   Yes     Order Specific Question:   Release to patient     Answer:   Immediate    Hemoglobin A1C     Standing Status:   Future     Standing Expiration Date:   1/24/2026    Comprehensive Metabolic Panel     Standing Status:   Future     Standing Expiration Date:   1/24/2026         A comprehensive HEALTH RISK ASSESSMENT was completed today. Results are summarized below:       There are NO COGNITIVE FUNCTION CONCERNS identified on today's screening.  There are NO FUNCTIONAL OR SAFETY CONCERNS were identified on today's screening for Physical Symptoms, Nutritional, Cognitive Function, Home Safety/Living Situation, Fall Risk, Activities of Daily Living, Independent Activities of Daily Living, Physical Activity, Timed Up and Go test and Whisper test.   The patient reports NO OPIOID PRESCRIPTIONS. This was confirmed through medication reconciliation and the [facility] website.    The patient is NOT A TOBACCO USER.  The patient reports NO SIGNIFICANT ALCOHOL USE.     All Questions regarding food, transportation or housing were not answered today.    No follow-ups on file. In addition to their scheduled follow up, the patient has also been instructed to follow up on as needed basis.

## 2024-11-25 NOTE — ASSESSMENT & PLAN NOTE
Health Maintenance Due   Topic Date Due    Shingles Vaccine (3 of 3) 03/13/2020    Influenza Vaccine (1) 09/01/2024    COVID-19 Vaccine (5 - 2024-25 season) 09/01/2024    Mammogram  11/02/2024     Flu shot today.  MMG ordered.  Recent labs reviewed and discussed.  Advance Care Planning     Date: 11/25/2024  Patient did not wish or was not able to name a surrogate decision maker or provide an Advance Care Plan.  I gave her some information on ACP.

## 2024-11-27 ENCOUNTER — HOSPITAL ENCOUNTER (OUTPATIENT)
Dept: RADIOLOGY | Facility: HOSPITAL | Age: 69
Discharge: HOME | End: 2024-11-27
Attending: INTERNAL MEDICINE
Payer: MEDICARE

## 2024-11-27 DIAGNOSIS — Z12.31 BREAST CANCER SCREENING BY MAMMOGRAM: ICD-10-CM

## 2024-11-27 PROCEDURE — 77067 SCR MAMMO BI INCL CAD: CPT | Mod: TC

## 2024-12-04 ENCOUNTER — HOSPITAL ENCOUNTER (OUTPATIENT)
Dept: RADIOLOGY | Facility: HOSPITAL | Age: 69
Discharge: HOME | End: 2024-12-04
Attending: INTERNAL MEDICINE
Payer: MEDICARE

## 2024-12-04 DIAGNOSIS — E05.90 HYPERTHYROIDISM: ICD-10-CM

## 2024-12-04 PROCEDURE — 76536 US EXAM OF HEAD AND NECK: CPT | Mod: TC

## 2024-12-04 PROCEDURE — 78014 THYROID IMAGING W/BLOOD FLOW: CPT | Mod: TC

## 2024-12-04 PROCEDURE — A9516 IODINE I-123 SOD IODIDE MIC: HCPCS | Performed by: INTERNAL MEDICINE

## 2024-12-04 RX ORDER — SODIUM IODIDE I 123 200 UCI/1
252 CAPSULE, GELATIN COATED ORAL
Status: COMPLETED | OUTPATIENT
Start: 2024-12-04 | End: 2024-12-04

## 2024-12-04 RX ADMIN — SODIUM IODIDE I 123 252 MICROCI: 200 CAPSULE, GELATIN COATED ORAL at 08:12

## 2024-12-05 ENCOUNTER — OFFICE VISIT (OUTPATIENT)
Dept: INTERNAL MEDICINE | Facility: CLINIC | Age: 69
End: 2024-12-05
Payer: MEDICARE

## 2024-12-05 VITALS
HEART RATE: 89 BPM | BODY MASS INDEX: 29.64 KG/M2 | DIASTOLIC BLOOD PRESSURE: 68 MMHG | WEIGHT: 151 LBS | OXYGEN SATURATION: 98 % | RESPIRATION RATE: 18 BRPM | HEIGHT: 60 IN | SYSTOLIC BLOOD PRESSURE: 130 MMHG

## 2024-12-05 DIAGNOSIS — J06.9 UPPER RESPIRATORY TRACT INFECTION, UNSPECIFIED TYPE: Primary | ICD-10-CM

## 2024-12-05 DIAGNOSIS — E04.1 THYROID NODULE: ICD-10-CM

## 2024-12-05 LAB
CTP QC/QA: YES
POC MOLECULAR INFLUENZA A AGN: NEGATIVE
POC MOLECULAR INFLUENZA B AGN: NEGATIVE

## 2024-12-05 RX ORDER — AZITHROMYCIN 250 MG/1
TABLET, FILM COATED ORAL
Qty: 6 TABLET | Refills: 0 | Status: SHIPPED | OUTPATIENT
Start: 2024-12-05 | End: 2024-12-10

## 2024-12-05 NOTE — ASSESSMENT & PLAN NOTE
She is having chills and sweats and she is day 8 of feeling poorly.  Flu test is negative.  She tested negative for Covid at home.  Will treat for possible bacterial infection.  I told her to call if not better by Monday or sooner if she feels worse.

## 2024-12-05 NOTE — ASSESSMENT & PLAN NOTE
They thyroid scan shows the 3 nodules, previously followed by Dr. Buck.  Thyroid uptake scan done today.  Results pending.

## 2024-12-05 NOTE — PROGRESS NOTES
Subjective:      Chief Complaint: Cough (C/o productive cough X8 days- clear mucus, she did have a little mixed blood sputum last week/Maybe had fever on Saturday- felt chills and had sweats/C/o body aches X1wk /She feels like she has a gland that is sore on L side of throat )      HPI:She is here w/ c/o a cough and sneezing, body aches.  She has a pleuritic pain under her left rib and excessive coughing.  There was a small amount of hemoptysis last week.She also has a swollen tender gland on her left neck associated with a left sided ear ache and reduced hearing.  She also noticed a blood tinged sputum coming from her nose whenshe would blow.  It has improved to clear now.  She is having some headaches -- bitemporal.  She had a hard time sleeping last night because she felt sob and was coughing.  Denies sick contacts.  She tested negative for covid this morning.    Problem Noted   Encounter for Medicare Annual Wellness Exam 10/20/2023   URI (Upper Respiratory Infection) 12/5/2024   Gastroenteritis 11/25/2024   Hip Pain 8/25/2022   Arthritis 5/17/2022   Gastroesophageal Reflux Disease 5/17/2022   Dilatation of Esophagus 5/17/2022    STATES DILATED ABOUT EVERY 2 YEARS     Degeneration of Intervertebral Disc of Cervical Region 5/17/2022   Personal History of Colonic Polyps 5/17/2022   History of Renal Cell Carcinoma 5/17/2022    She is followed by Dr. Vera, dx'ed in approx. 2017     Hyperlipidemia 5/17/2022    SAW DR ZAARTE 1/4/2016 CAROTID US DONE AT THAT TIME     Sleep Apnea 5/17/2022    non compliant with CPAP     Subclinical Hyperthyroidism 5/17/2022   Thyroid Nodule 5/17/2022    followed by Dr. Davis -- she has 3 nodules being followed.     Diverticulosis of Colon 11/8/2013   Family History of Colonic Polyps 11/8/2013   Diaphragmatic Hernia Without Obstruction Or Gangrene 9/20/2013   Diarrhea (Resolved) 5/21/2024   Dysuria (Resolved) 5/21/2024   Chronic Back Pain (Resolved) 10/20/2023   Disease of  Gallbladder, Unspecified (Resolved) 3/22/2023   Benign Hypertension (Resolved) 3/22/2023   Esophageal Obstruction (Resolved) 3/22/2023   Tinnitus (Resolved) 8/25/2022   Palpitations (Resolved) 8/25/2022   Achalasia of Digestive Tract (Resolved) 5/17/2022   Diverticulum of Esophagus, Acquired (Resolved) 9/20/2013        The patient's Health Maintenance was reviewed and the following appears to be due:   Health Maintenance Due   Topic Date Due    Shingles Vaccine (3 of 3) 03/13/2020    COVID-19 Vaccine (5 - 2024-25 season) 09/01/2024       Past Medical History:  Past Medical History:   Diagnosis Date    Achalasia     Achalasia of digestive tract 05/17/2022    Benign hypertension 03/22/2023    Cancer of left kidney     removed 30% of left kidney    Chronic back pain 10/20/2023    Disease of gallbladder, unspecified 03/22/2023    Diverticulitis     Diverticulum of esophagus, acquired 09/20/2013    Dysphagia     Early satiety     Epigastric fullness     Esophageal obstruction 03/22/2023    Fibroid tumor     positive for cancer    High cholesterol     Osteoarthritis of both hips     Osteoporosis     S/P ASHOK (total abdominal hysterectomy)     had a fibroid    Sleep apnea     CPAP    Tinnitus, left      Review of patient's allergies indicates:   Allergen Reactions    Diphenhydramine hcl Other (See Comments)    Hydrocodone-acetaminophen      Other reaction(s): weird feeling     Current Outpatient Medications on File Prior to Visit   Medication Sig Dispense Refill    benzonatate (TESSALON) 200 MG capsule Take 1 capsule (200 mg total) by mouth 3 (three) times daily as needed for Cough. 90 capsule 3    calcium-vitamin D 600 mg-10 mcg (400 unit) Tab Take 2 tablets by mouth once daily.      estradioL (ESTRACE) 1 MG tablet Take 1 tablet by mouth once daily 90 tablet 1    ezetimibe (ZETIA) 10 mg tablet Take 10 mg by mouth every evening.      fenofibrate micronized (LOFIBRA) 134 MG Cap Take 134 mg by mouth.      LACTASE ENZYME ORAL  Take 2 capsules by mouth once daily.      montelukast (SINGULAIR) 10 mg tablet Take 1 tablet by mouth once daily 90 tablet 0    omeprazole (PRILOSEC OTC) 20 MG tablet Take 20 mg by mouth once daily.      potassium chloride 10% (KAYCIEL) 20 mEq/15 mL oral solution Take 20 mEq by mouth once daily. One tablespoon in the morning and night      simvastatin (ZOCOR) 40 MG tablet   See Instructions, TAKE 1 TABLET BY MOUTH EVERY DAY AT BEDTIME, # 30 tab(s), 11 Refill(s), Pharmacy: [id number], 153, cm, Height/Length Dosing, 08/23/21 11:25:00 CDT, 65.77, kg, Weight Dosing, 08/23/21 11:25:00 CDT      sucralfate (CARAFATE) 1 gram tablet Take 1 tablet (1 g total) by mouth 4 (four) times daily before meals and nightly. for 14 days 56 tablet 0     No current facility-administered medications on file prior to visit.       Review of Systems    Objective:   /68 (BP Location: Right arm, Patient Position: Sitting)   Pulse 89   Resp 18   Ht 5' (1.524 m)   Wt 68.5 kg (151 lb)   SpO2 98%   BMI 29.49 kg/m²     Physical Exam  Vitals reviewed.   Constitutional:       General: She is not in acute distress.     Appearance: Normal appearance. She is not ill-appearing or diaphoretic.   HENT:      Head: Normocephalic and atraumatic.      Right Ear: Tympanic membrane, ear canal and external ear normal. There is no impacted cerumen.      Left Ear: Tympanic membrane, ear canal and external ear normal. There is no impacted cerumen.      Mouth/Throat:      Mouth: Mucous membranes are moist.      Pharynx: Oropharynx is clear. No oropharyngeal exudate or posterior oropharyngeal erythema.   Cardiovascular:      Rate and Rhythm: Normal rate and regular rhythm.      Heart sounds: Normal heart sounds.   Pulmonary:      Effort: Pulmonary effort is normal.      Comments: Maybe a few slightl rales on left side(posteriorly) but cleared with deep breaths  Musculoskeletal:      Cervical back: Neck supple.   Lymphadenopathy:       Cervical: No cervical adenopathy.   Skin:     General: Skin is warm and dry.   Neurological:      General: No focal deficit present.      Mental Status: She is alert.   Psychiatric:         Mood and Affect: Mood normal.         Behavior: Behavior normal.         Thought Content: Thought content normal.         Judgment: Judgment normal.       Assessment and Plan:     URI (upper respiratory infection)  She is having chills and sweats and she is day 8 of feeling poorly.  Flu test is negative.  She tested negative for Covid at home.  Will treat for possible bacterial infection.  I told her to call if not better by Monday or sooner if she feels worse.      Thyroid nodule  They thyroid scan shows the 3 nodules, previously followed by Dr. Buck.  Thyroid uptake scan done today.  Results pending.      No follow-ups on file.    Medications Ordered This Encounter   Medications    azithromycin (Z-DILLON) 250 MG tablet     Sig: Take 2 tablets by mouth on day 1; Take 1 tablet by mouth on days 2-5     Dispense:  6 tablet     Refill:  0     [unfilled]  No orders of the defined types were placed in this encounter.

## 2024-12-10 ENCOUNTER — TELEPHONE (OUTPATIENT)
Dept: INTERNAL MEDICINE | Facility: CLINIC | Age: 69
End: 2024-12-10
Payer: MEDICARE

## 2024-12-10 DIAGNOSIS — E06.9 THYROIDITIS: Primary | ICD-10-CM

## 2024-12-10 RX ORDER — CHLOPHEDIANOL HCL AND PYRILAMINE MALEATE 12.5; 12.5 MG/5ML; MG/5ML
10 SOLUTION ORAL EVERY 8 HOURS PRN
Qty: 473 ML | Refills: 0 | Status: SHIPPED | OUTPATIENT
Start: 2024-12-10

## 2024-12-10 RX ORDER — METHYLPREDNISOLONE 4 MG/1
TABLET ORAL
Qty: 21 EACH | Refills: 0 | Status: SHIPPED | OUTPATIENT
Start: 2024-12-10

## 2024-12-10 RX ORDER — METHIMAZOLE 5 MG/1
5 TABLET ORAL DAILY
Qty: 30 TABLET | Refills: 11 | Status: SHIPPED | OUTPATIENT
Start: 2024-12-10 | End: 2025-12-10

## 2024-12-10 RX ORDER — DOXYCYCLINE HYCLATE 100 MG
100 TABLET ORAL 2 TIMES DAILY
Qty: 20 TABLET | Refills: 0 | Status: SHIPPED | OUTPATIENT
Start: 2024-12-10

## 2024-12-10 NOTE — TELEPHONE ENCOUNTER
Some bacteria have developed a resistance to azithromycin.  Let's try doxycycline.  And I will add a steroid to see if that helps.  Warn her that the medrol can cause some flushing and keep her awake at night, but the side effects will quickly resolve as she tapers the dose.  Tell her to call if not feeling better after a few days.

## 2024-12-10 NOTE — TELEPHONE ENCOUNTER
----- Message from Libra sent at 12/10/2024  1:51 PM CST -----  Regarding: advice  Who Called: Amber Gomez    Caller is requesting assistance/information from provider's office.    Symptoms (please be specific):    How long has patient had these symptoms:    List of preferred pharmacies on file (remove unneeded): [unfilled]  If different, enter pharmacy into here including location and phone number:       Preferred Method of Contact: Phone Call      Patient's Preferred Phone Number on File: [phone]   Best Call Back Number, if different:    Additional Information: stated that she is still coughing and she has been taking OTC and meds that she was rx. Stated that she finished the z-rosa yesterday and the cough hasn't gotten better.  Requested call back for nurse about what to do about the cough. Stated that cough has not been productive.  Please advise

## 2024-12-18 ENCOUNTER — HOSPITAL ENCOUNTER (OUTPATIENT)
Dept: RADIOLOGY | Facility: HOSPITAL | Age: 69
Discharge: HOME | End: 2024-12-18
Attending: INTERNAL MEDICINE
Payer: MEDICARE

## 2024-12-18 DIAGNOSIS — R92.8 ABNORMAL MAMMOGRAM: ICD-10-CM

## 2024-12-18 PROCEDURE — 76642 ULTRASOUND BREAST LIMITED: CPT | Mod: TC,LT

## 2024-12-18 PROCEDURE — 77065 DX MAMMO INCL CAD UNI: CPT | Mod: TC,LT

## 2024-12-25 DIAGNOSIS — J30.2 SEASONAL ALLERGIES: ICD-10-CM

## 2024-12-26 RX ORDER — MONTELUKAST SODIUM 10 MG/1
10 TABLET ORAL
Qty: 90 TABLET | Refills: 0 | Status: SHIPPED | OUTPATIENT
Start: 2024-12-26

## 2025-01-07 ENCOUNTER — TELEPHONE (OUTPATIENT)
Dept: INTERNAL MEDICINE | Facility: CLINIC | Age: 70
End: 2025-01-07
Payer: MEDICARE

## 2025-01-07 DIAGNOSIS — E04.1 THYROID NODULE: Primary | ICD-10-CM

## 2025-01-07 DIAGNOSIS — R05.9 COUGH, UNSPECIFIED TYPE: ICD-10-CM

## 2025-01-07 RX ORDER — BENZONATATE 200 MG/1
200 CAPSULE ORAL 3 TIMES DAILY PRN
Qty: 45 CAPSULE | Refills: 1 | Status: SHIPPED | OUTPATIENT
Start: 2025-01-07

## 2025-01-07 RX ORDER — METHIMAZOLE 5 MG/1
5 TABLET ORAL DAILY
Qty: 30 TABLET | Refills: 11 | Status: SHIPPED | OUTPATIENT
Start: 2025-01-07 | End: 2026-01-07

## 2025-01-07 NOTE — TELEPHONE ENCOUNTER
----- Message from Cata sent at 1/7/2025  2:21 PM CST -----  Regarding: med refill  .Who Called: Amber Gomez    Refill or New Rx:Refill  RX Name and Strength:benzonatate (TESSALON) 200 MG capsule  How is the patient currently taking it? (ex. 1XDay):1xday  Is this a 30 day or 90 day RX:90    Refill or New Rx:Refill  RX Name and Strength:methIMAzole (TAPAZOLE) 5 MG Tab  How is the patient currently taking it? (ex. 1XDay):1xday  Is this a 30 day or 90 day RX:30     Local or Mail Order:local  List of preferred pharmacies on file (remove unneeded):[facility]ACY #[id number] - [place] - [address] [[id number]]  Ordering Provider:Shahnaz      Preferred Method of Contact: Phone Call  Patient's Preferred Phone Number on File: [phone]   Best Call Back Number, if different:  Additional Information: pt needs authorization from  to refill prescription

## 2025-02-05 ENCOUNTER — TELEPHONE (OUTPATIENT)
Dept: INTERNAL MEDICINE | Facility: CLINIC | Age: 70
End: 2025-02-05
Payer: MEDICARE

## 2025-02-05 NOTE — TELEPHONE ENCOUNTER
----- Message from Kristy sent at 2/5/2025 12:50 PM CST -----  Regarding: phar change  .Type:  Needs Medical Advice    Who Called: pt  Symptoms (please be specific):    How long has patient had these symptoms:    Pharmacy name and phone #:    Would the patient rather a call back or a response via [facility]?   Best Call Back Number:  [phone]  Additional Information: Phar change - [facility] [address] - update med list call in med

## 2025-02-07 ENCOUNTER — TELEPHONE (OUTPATIENT)
Dept: INTERNAL MEDICINE | Facility: CLINIC | Age: 70
End: 2025-02-07
Payer: MEDICARE

## 2025-02-07 DIAGNOSIS — E78.5 HYPERLIPIDEMIA, UNSPECIFIED HYPERLIPIDEMIA TYPE: Primary | ICD-10-CM

## 2025-02-07 DIAGNOSIS — Z79.890 HORMONE REPLACEMENT THERAPY: ICD-10-CM

## 2025-02-07 DIAGNOSIS — J30.2 SEASONAL ALLERGIES: ICD-10-CM

## 2025-02-07 RX ORDER — SIMVASTATIN 40 MG/1
40 TABLET, FILM COATED ORAL NIGHTLY
Qty: 90 TABLET | Refills: 3 | Status: SHIPPED | OUTPATIENT
Start: 2025-02-07

## 2025-02-07 RX ORDER — EZETIMIBE 10 MG/1
10 TABLET ORAL NIGHTLY
Qty: 90 TABLET | Refills: 3 | Status: SHIPPED | OUTPATIENT
Start: 2025-02-07

## 2025-02-07 RX ORDER — FENOFIBRATE 134 MG/1
134 CAPSULE ORAL
Qty: 90 CAPSULE | Refills: 3 | Status: SHIPPED | OUTPATIENT
Start: 2025-02-07

## 2025-02-07 RX ORDER — ESTRADIOL 1 MG/1
1 TABLET ORAL DAILY
Qty: 90 TABLET | Refills: 1 | Status: SHIPPED | OUTPATIENT
Start: 2025-02-07

## 2025-02-07 RX ORDER — MONTELUKAST SODIUM 10 MG/1
10 TABLET ORAL DAILY
Qty: 90 TABLET | Refills: 0 | Status: SHIPPED | OUTPATIENT
Start: 2025-02-07

## 2025-02-07 NOTE — TELEPHONE ENCOUNTER
Simvastatin Rx last ordered 8-23-21 Historical Provider    Fenofibrate last ordered 9-12-24 Historical Provider    Ezetimibe last ordered 9-8-22 Historical Provider    Estrace and Montelukast sent to the pharmacy.

## 2025-02-07 NOTE — TELEPHONE ENCOUNTER
----- Message from Kathy sent at 2/7/2025  9:39 AM CST -----  Who Called: Amber Gomez    Refill or New Rx:Refill  RX Name and Strength:estradioL (ESTRACE) 1 MG tablet  How is the patient currently taking it? (ex. 1XDay):Take 1 tablet by mouth once daily - Oral  Is this a 30 day or 90 day RX:90  Local or Mail Order:local   List of preferred pharmacies on file (remove unneeded): [facility]/PHARMACY #[id number] - [place], [place] - [address] [[id number]]  [id number] [place] [address] [[id number]]        Preferred Method of Contact: Phone Call  Patient's Preferred Phone Number on File: [phone]   Best Call Back Number, if different:  Additional Information:     Who Called: Amber Gomez    Refill or New Rx:Refill  RX Name and Strength:montelukast (SINGULAIR) 10 mg tablet  How is the patient currently taking it? (ex. 1XDay):Take 1 tablet by mouth once daily - Oral  Is this a 30 day or 90 day RX:90  Local or Mail Order:local   List of preferred pharmacies on file (remove unneeded): [facility]ACY #[id number] - [place], [place] - [address] [[id number]]    [id number] [place] - [address] [[id number]]        Preferred Method of Contact: Phone Call  Patient's Preferred Phone Number on File: [phone]   Best Call Back Number, if different:  Additional Information:     Who Called: Amber Gomez    Refill or New Rx:Refill  RX Name and Strength:simvastatin (ZOCOR) 40 MG tablet  How is the patient currently taking it? (ex. 1XDay):ee Instructions, TAKE 1 TABLET BY MOUTH EVERY DAY AT BEDTIME  Is this a 30 day or 90 day RX:90  Local or Mail Order:local   List of preferred pharmacies on file (remove unneeded):  [facility]ARMACY #[id number] - [place], [place] - [address] [[id number]]    Ordering Provider:Aurora Christie MD      Preferred Method of Contact: Phone Call  Patient's Preferred Phone Number on File: [phone]   Best Call Back Number, if different:  Additional Information:      Who Called: Amber Gomez    Refill or New Rx:Refill  RX Name and Strength:fenofibrate micronized (LOFIBRA) 134 MG Cap  How is the patient currently taking it? (ex. 1XDay):Take 134 mg by mouth. - Oral  Is this a 30 day or 90 day RX:90  Local or Mail Order:local   List of preferred pharmacies on file (remove unneeded): [facility]/PHARMACY #[id number] - [place], [place] - [email or web address]  [[id number]]    Ordering Provider:Aurora Christie MD      Preferred Method of Contact: Phone Call  Patient's Preferred Phone Number on File: [phone]   Best Call Back Number, if different:  Additional Information:     Who Called: Abmer Gomez    Refill or New Rx:Refill  RX Name and Strength:ezetimibe (ZETIA) 10 mg tablet  How is the patient currently taking it? (ex. 1XDay):Take 10 mg by mouth every evening. - Oral  Is this a 30 day or 90 day RX:90  Local or Mail Order:local   List of preferred pharmacies on file (remove unneeded):[facility]/PHARMACY #[id number] - [place], [place] - [address]  [[id number]]    Ordering Provider:Aurora Christie MD      Preferred Method of Contact: Phone Call  Patient's Preferred Phone Number on File: [phone]   Best Call Back Number, if different:  Additional Information:      Who Called: Amber Gomez

## 2025-03-26 ENCOUNTER — DOCUMENTATION ONLY (OUTPATIENT)
Dept: INTERNAL MEDICINE | Facility: CLINIC | Age: 70
End: 2025-03-26
Payer: MEDICARE

## 2025-05-05 DIAGNOSIS — J30.2 SEASONAL ALLERGIES: ICD-10-CM

## 2025-05-05 RX ORDER — MONTELUKAST SODIUM 10 MG/1
10 TABLET ORAL
Qty: 90 TABLET | Refills: 0 | Status: SHIPPED | OUTPATIENT
Start: 2025-05-05

## 2025-05-20 ENCOUNTER — TELEPHONE (OUTPATIENT)
Dept: INTERNAL MEDICINE | Facility: CLINIC | Age: 70
End: 2025-05-20
Payer: MEDICARE

## 2025-05-20 NOTE — TELEPHONE ENCOUNTER
Copied from CRM #[id number]. Topic: General Inquiry - Patient Advice  >> May 20, 2025  9:40 AM Liliana wrote:  .Who Called: Amber Gomez    What order is the patient requesting: na   When does the expect the orders to be performed? na        Preferred Method of Contact: Phone Call  Patient's Preferred Phone Number on File: [phone]   Best Call Back Number, if different:  Additional Information: pt wants to  lab orders to take to [facility].Please call pt when orders are ready

## 2025-05-20 NOTE — TELEPHONE ENCOUNTER
Spoke to patient. I told her I was not sure if she needed any repeat labs, she did see Dr. Lazo recently and he rechecked her thyroid labs and they were fine. I will req the labs and then maybe have Dr. Christie review chart to see if she needs labs for appt. She verbalized understanding.

## 2025-05-28 ENCOUNTER — OFFICE VISIT (OUTPATIENT)
Dept: INTERNAL MEDICINE | Facility: CLINIC | Age: 70
End: 2025-05-28
Payer: MEDICARE

## 2025-05-28 ENCOUNTER — DOCUMENTATION ONLY (OUTPATIENT)
Dept: INTERNAL MEDICINE | Facility: CLINIC | Age: 70
End: 2025-05-28

## 2025-05-28 VITALS
DIASTOLIC BLOOD PRESSURE: 80 MMHG | WEIGHT: 149 LBS | OXYGEN SATURATION: 99 % | HEART RATE: 82 BPM | BODY MASS INDEX: 29.25 KG/M2 | SYSTOLIC BLOOD PRESSURE: 130 MMHG | HEIGHT: 60 IN | RESPIRATION RATE: 18 BRPM

## 2025-05-28 DIAGNOSIS — E05.90 HYPERTHYROIDISM: ICD-10-CM

## 2025-05-28 DIAGNOSIS — E05.90 SUBCLINICAL HYPERTHYROIDISM: ICD-10-CM

## 2025-05-28 DIAGNOSIS — E78.5 HYPERLIPIDEMIA, UNSPECIFIED HYPERLIPIDEMIA TYPE: Primary | ICD-10-CM

## 2025-05-28 PROBLEM — J06.9 URI (UPPER RESPIRATORY INFECTION): Status: RESOLVED | Noted: 2024-12-05 | Resolved: 2025-05-28

## 2025-05-28 PROCEDURE — 99214 OFFICE O/P EST MOD 30 MIN: CPT | Mod: ,,, | Performed by: INTERNAL MEDICINE

## 2025-05-28 NOTE — PROGRESS NOTES
Subjective:      Chief Complaint: Follow-up (6mo)      HPI:She is here for f/u hyperthyroidism and thyroid nodules.  She has been on methimazole since December.  I referred her to Dr. Lazo.  Repeat testing of the TSH was normal this month.  She was having some aching in her body that seemed to get better when she was off her statin.  She developed more body aches when she got back on the statin.  So she is taking it every other day.  She's been exercising since August.  And that is going well.    Problem Noted   Encounter for Medicare Annual Wellness Exam 10/20/2023   Gastroenteritis 11/25/2024   Hip Pain 8/25/2022   Arthritis 5/17/2022   Gastroesophageal Reflux Disease 5/17/2022   Dilatation of Esophagus 5/17/2022    STATES DILATED ABOUT EVERY 2 YEARS     Degeneration of Intervertebral Disc of Cervical Region 5/17/2022   Personal history of colonic polyps 5/17/2022    Replacing diagnoses that were inactivated after the 4/1 regulatory import.     History of Renal Cell Carcinoma 5/17/2022    She is followed by Dr. Vera, dx'ed in approx. 2017     Hyperlipidemia 5/17/2022    SAW DR ZARATE 1/4/2016 CAROTID US DONE AT THAT TIME     Sleep Apnea 5/17/2022    non compliant with CPAP     Hyperthyroidism 5/17/2022    Dr. Lazo is suspecting possible autoimmunie along with mng.     Thyroid Nodule 5/17/2022    followed by Dr. Davis -- she has 3 nodules being followed.     Diverticulosis of Colon 11/8/2013   Family History of Colonic Polyps 11/8/2013   Diaphragmatic Hernia Without Obstruction Or Gangrene 9/20/2013   URI (Upper Respiratory Infection) (Resolved) 12/5/2024   Diarrhea (Resolved) 5/21/2024   Dysuria (Resolved) 5/21/2024   Chronic Back Pain (Resolved) 10/20/2023   Disease of Gallbladder, Unspecified (Resolved) 3/22/2023   Benign Hypertension (Resolved) 3/22/2023   Esophageal Obstruction (Resolved) 3/22/2023   Tinnitus (Resolved) 8/25/2022   Palpitations (Resolved) 8/25/2022   Achalasia of Digestive Tract  (Resolved) 5/17/2022   Diverticulum of Esophagus, Acquired (Resolved) 9/20/2013        The patient's Health Maintenance was reviewed and the following appears to be due:   Health Maintenance Due   Topic Date Due    Shingles Vaccine (3 of 3) 03/13/2020    COVID-19 Vaccine (5 - 2024-25 season) 09/01/2024    DEXA Scan  05/06/2025       Past Medical History:  Past Medical History:   Diagnosis Date    Achalasia     Achalasia of digestive tract 05/17/2022    Benign hypertension 03/22/2023    Cancer of left kidney     removed 30% of left kidney    Chronic back pain 10/20/2023    Disease of gallbladder, unspecified 03/22/2023    Diverticulitis     Diverticulum of esophagus, acquired 09/20/2013    Dysphagia     Early satiety     Epigastric fullness     Esophageal obstruction 03/22/2023    Fibroid tumor     positive for cancer    High cholesterol     Osteoarthritis of both hips     Osteoporosis     S/P ASHOK (total abdominal hysterectomy)     had a fibroid    Sleep apnea     CPAP    Tinnitus, left      Review of patient's allergies indicates:   Allergen Reactions    Diphenhydramine hcl Other (See Comments)    Hydrocodone-acetaminophen      Other reaction(s): weird feeling     Medications Ordered Prior to Encounter[1]    Review of Systems    Objective:   /80 (BP Location: Left arm, Patient Position: Sitting)   Pulse 82   Resp 18   Ht 5' (1.524 m)   Wt 67.6 kg (149 lb)   SpO2 99%   BMI 29.10 kg/m²     Physical Exam  Vitals reviewed.   Constitutional:       General: She is not in acute distress.     Appearance: Normal appearance. She is not ill-appearing or diaphoretic.   HENT:      Head: Normocephalic and atraumatic.   Pulmonary:      Effort: Pulmonary effort is normal.   Skin:     General: Skin is warm and dry.   Neurological:      General: No focal deficit present.      Mental Status: She is alert.   Psychiatric:         Mood and Affect: Mood normal.         Behavior: Behavior normal.         Thought Content:  Thought content normal.         Judgment: Judgment normal.       Assessment and Plan:     Hyperthyroidism  TSH is at goal with the methimazole.  Will continue that dose.  She saw Violet who repeated the labs.  She will f/u with him in October.    Hyperlipidemia  She is now taking the statin every other day because of myalgias.  Recheck lipids in 6 mos.      Follow up in about 6 months (around 11/28/2025).       [unfilled]  Orders Placed This Encounter   Procedures    TSH     Standing Status:   Future     Number of Occurrences:   1     Expected Date:   11/28/2025     Expiration Date:   7/27/2026    T4, Free     Standing Status:   Future     Number of Occurrences:   1     Expected Date:   11/28/2025     Expiration Date:   7/27/2026    CBC Auto Differential     Standing Status:   Future     Number of Occurrences:   1     Expected Date:   11/28/2025     Expiration Date:   7/27/2026    Comprehensive Metabolic Panel     Standing Status:   Future     Number of Occurrences:   1     Expected Date:   11/28/2025     Expiration Date:   7/27/2026    Lipid Panel     Standing Status:   Future     Number of Occurrences:   1     Expected Date:   11/28/2025     Expiration Date:   11/28/2026            [1]   Current Outpatient Medications on File Prior to Visit   Medication Sig Dispense Refill    benzonatate (TESSALON) 200 MG capsule Take 1 capsule (200 mg total) by mouth 3 (three) times daily as needed for Cough. 45 capsule 1    calcium-vitamin D 600 mg-10 mcg (400 unit) Tab Take 2 tablets by mouth once daily.      estradioL (ESTRACE) 1 MG tablet Take 1 tablet (1 mg total) by mouth once daily. 90 tablet 1    ezetimibe (ZETIA) 10 mg tablet Take 1 tablet (10 mg total) by mouth every evening. 90 tablet 3    fenofibrate micronized (LOFIBRA) 134 MG Cap Take 1 capsule (134 mg total) by mouth daily with breakfast. 90 capsule 3    LACTASE ENZYME ORAL Take 2 capsules by mouth once daily.      methIMAzole (TAPAZOLE) 5 MG Tab Take 1 tablet (5  mg total) by mouth once daily. 30 tablet 11    montelukast (SINGULAIR) 10 mg tablet TAKE 1 TABLET BY MOUTH EVERY DAY 90 tablet 0    omeprazole (PRILOSEC OTC) 20 MG tablet Take 20 mg by mouth once daily.      potassium chloride 10% (KAYCIEL) 20 mEq/15 mL oral solution Take 20 mEq by mouth once daily. One tablespoon in the morning and night      simvastatin (ZOCOR) 40 MG tablet Take 1 tablet (40 mg total) by mouth every evening. (Patient taking differently: Take 40 mg by mouth every other day.) 90 tablet 3    [DISCONTINUED] doxycycline (VIBRA-TABS) 100 MG tablet Take 1 tablet (100 mg total) by mouth 2 (two) times daily. 20 tablet 0    [DISCONTINUED] methylPREDNISolone (MEDROL DOSEPACK) 4 mg tablet use as directed 21 each 0    [DISCONTINUED] pyrilamine-chlophedianoL (NINJACOF) 12.5-12.5 mg/5 mL Liqd Take 10 mLs by mouth every 8 (eight) hours as needed (cough). 473 mL 0     No current facility-administered medications on file prior to visit.

## 2025-05-28 NOTE — ASSESSMENT & PLAN NOTE
TSH is at goal with the methimazole.  Will continue that dose.  She saw Violet who repeated the labs.  She will f/u with him in October.

## 2025-08-03 DIAGNOSIS — Z79.890 HORMONE REPLACEMENT THERAPY: ICD-10-CM

## 2025-08-04 RX ORDER — ESTRADIOL 1 MG/1
1 TABLET ORAL
Qty: 90 TABLET | Refills: 1 | Status: SHIPPED | OUTPATIENT
Start: 2025-08-04

## (undated) DEVICE — COVER TIP CURVED SCISSORS XI

## (undated) DEVICE — SEAL UNIVERSAL 5MM-8MM XI

## (undated) DEVICE — SYR 50CC LL

## (undated) DEVICE — SOL CLEARIFY VISUALIZATION LAP

## (undated) DEVICE — SET TRI-LUMEN FILTERED TUBE

## (undated) DEVICE — GLOVE PROTEXIS BLUE LATEX 7

## (undated) DEVICE — SUT MCRYL PLUS 4-0 PS2 27IN

## (undated) DEVICE — BASIN EMESIS ROSE 700CC

## (undated) DEVICE — TIP SUCTION YANKAUER

## (undated) DEVICE — SET EXTENSION MICROBORE 74IN

## (undated) DEVICE — ELECTRODE BLD EXT 6.50 ST DISP

## (undated) DEVICE — TROCAR KII FIOS ZTHREAD 11X100

## (undated) DEVICE — PORT ACCESS 8MM W/120MM LOW

## (undated) DEVICE — GLOVE PROTEXIS HYDROGEL SZ6.5

## (undated) DEVICE — SOL IRRI STRL WATER 1000ML

## (undated) DEVICE — KIT VALVE DEFENDO ENDOSCOPY

## (undated) DEVICE — OBTURATOR BLADELESS 8MM XI CLR

## (undated) DEVICE — BANDAID GARFIELD

## (undated) DEVICE — Device

## (undated) DEVICE — ELECTRODE PATIENT RETURN DISP

## (undated) DEVICE — SYR ONLY LUER LOCK 20CC

## (undated) DEVICE — KIT SURGICAL COLON .25 1.1OZ

## (undated) DEVICE — TRAY CATH FOL SIL URIMTR 16FR

## (undated) DEVICE — KIT SURGICAL TURNOVER

## (undated) DEVICE — NDL 20GX1-1/2IN IB

## (undated) DEVICE — CHLORAPREP W TINT 26ML APPL

## (undated) DEVICE — TROCAR ENDO Z THREAD KII 5X100

## (undated) DEVICE — COVER PROBE US 5.5X58L NON LTX

## (undated) DEVICE — ADHESIVE DERMABOND ADVANCED

## (undated) DEVICE — KIT CANIST SUCTION 1200CC

## (undated) DEVICE — SUT SILK 0 SH 30IN BLK BR

## (undated) DEVICE — HOLDER STRIP-T SELF ADH 2X10IN

## (undated) DEVICE — CATH BLLN CRE 5.5FR 18-20MM

## (undated) DEVICE — COVER MAYO STAND REINFRCD 30

## (undated) DEVICE — CONTAINER SPECIMEN SCREW 4OZ

## (undated) DEVICE — GLOVE PROTEXIS HYDROGEL SZ7

## (undated) DEVICE — IRRIGATOR SUCTION W/TIP

## (undated) DEVICE — SOL NACL IRR 1000ML BTL

## (undated) DEVICE — LEGGING SURG CONV 43X28IN

## (undated) DEVICE — HEMOSTAT SURGICEL 2X14IN

## (undated) DEVICE — COLLECTION SPECIMEN NEPTUNE

## (undated) DEVICE — SUT 2/0 30IN SILK BLK BRAI

## (undated) DEVICE — KIT GEN LAPAROSCOPY LAFAYETTE

## (undated) DEVICE — DRAPE COLUMN DAVINCI XI

## (undated) DEVICE — TUBING O2 FEMALE CONN 13FT

## (undated) DEVICE — DRAPE ARM DAVINCI XI

## (undated) DEVICE — JELLY SURGILUBE LUBE TUBE 2OZ

## (undated) DEVICE — BANDAGE CURITY SHEER ADH 1X3IN